# Patient Record
Sex: FEMALE | Race: WHITE | NOT HISPANIC OR LATINO | Employment: FULL TIME | URBAN - METROPOLITAN AREA
[De-identification: names, ages, dates, MRNs, and addresses within clinical notes are randomized per-mention and may not be internally consistent; named-entity substitution may affect disease eponyms.]

---

## 2010-06-01 LAB — EXTERNAL HIV SCREEN: NORMAL

## 2017-11-23 ENCOUNTER — HOSPITAL ENCOUNTER (EMERGENCY)
Facility: HOSPITAL | Age: 52
Discharge: HOME | End: 2017-11-23
Attending: EMERGENCY MEDICINE | Admitting: EMERGENCY MEDICINE
Payer: COMMERCIAL

## 2017-11-23 VITALS
DIASTOLIC BLOOD PRESSURE: 87 MMHG | SYSTOLIC BLOOD PRESSURE: 154 MMHG | TEMPERATURE: 97.2 F | WEIGHT: 98 LBS | OXYGEN SATURATION: 99 % | HEART RATE: 98 BPM | RESPIRATION RATE: 20 BRPM

## 2017-11-23 DIAGNOSIS — J45.901 ACUTE ASTHMA EXACERBATION: Primary | ICD-10-CM

## 2017-11-23 LAB — MAGNESIUM SERPL-MCNC: 2 MG/DL (ref 1.6–2.6)

## 2017-11-23 PROCEDURE — 94644 CONT INHLJ TX 1ST HOUR: CPT

## 2017-11-23 PROCEDURE — 99285 EMERGENCY DEPT VISIT HI MDM: CPT

## 2017-11-23 PROCEDURE — 83735 ASSAY OF MAGNESIUM: CPT | Performed by: EMERGENCY MEDICINE

## 2017-11-23 PROCEDURE — 96365 THER/PROPH/DIAG IV INF INIT: CPT

## 2017-11-23 PROCEDURE — 36415 COLL VENOUS BLD VENIPUNCTURE: CPT | Performed by: EMERGENCY MEDICINE

## 2017-11-23 PROCEDURE — 94760 N-INVAS EAR/PLS OXIMETRY 1: CPT

## 2017-11-23 RX ORDER — PREDNISONE 20 MG/1
60 TABLET ORAL ONCE
Status: COMPLETED | OUTPATIENT
Start: 2017-11-23 | End: 2017-11-23

## 2017-11-23 RX ORDER — MAGNESIUM SULFATE HEPTAHYDRATE 40 MG/ML
2 INJECTION, SOLUTION INTRAVENOUS ONCE
Status: COMPLETED | OUTPATIENT
Start: 2017-11-23 | End: 2017-11-23

## 2017-11-23 RX ORDER — PREDNISONE 20 MG/1
40 TABLET ORAL DAILY
Qty: 15 TABLET | Refills: 0 | Status: SHIPPED | OUTPATIENT
Start: 2017-11-23 | End: 2017-11-27

## 2017-11-23 RX ORDER — SODIUM CHLORIDE FOR INHALATION 0.9 %
3 VIAL, NEBULIZER (ML) INHALATION ONCE
Status: COMPLETED | OUTPATIENT
Start: 2017-11-23 | End: 2017-11-23

## 2017-11-23 RX ADMIN — MAGNESIUM SULFATE HEPTAHYDRATE 2 G: 40 INJECTION, SOLUTION INTRAVENOUS at 13:03

## 2017-11-23 RX ADMIN — IPRATROPIUM BROMIDE 1 MG: 0.5 SOLUTION RESPIRATORY (INHALATION) at 13:06

## 2017-11-23 RX ADMIN — ALBUTEROL SULFATE 10 MG: 2.5 SOLUTION RESPIRATORY (INHALATION) at 13:06

## 2017-11-23 RX ADMIN — ISODIUM CHLORIDE 3 ML: 0.03 SOLUTION RESPIRATORY (INHALATION) at 13:06

## 2017-11-23 RX ADMIN — PREDNISONE 60 MG: 20 TABLET ORAL at 13:01

## 2017-11-23 NOTE — ED ATTENDING ATTESTATION
Sage Mccauley MD, saw and evaluated the patient  I have discussed the patient with the resident/non-physician practitioner and agree with the resident's/non-physician practitioner's findings, Plan of Care, and MDM as documented in the resident's/non-physician practitioner's note, except where noted  All available labs and Radiology studies were reviewed  At this point I agree with the current assessment done in the Emergency Department  I have conducted an independent evaluation of this patient a history and physical is as follows:  Severe sob, worsening over 5hr  Used inhaled steroids today  Never admitted or intubated, only one prior ED visit  Smokes 5 cigs a day  No fever or productive cough  Using inhalers every 4 hours without relief  Ros otherwise neg and 12 systems were reviewed  On exam, tachcyardic, tachypneic, relatively hypoxic, hypertensive  Tripoding, ab breathing, retracting, severe wheezing with poor air movement  Head and neck benign, heart without m/r/g  Ab benign  Neuro intact  Imp: status asthmaticus  Heart neb, steroids, mag   Pt with 45 min bedside crit care time outside of separately billable procedures    Critical Care Time  CritCare Time

## 2017-11-23 NOTE — ED PROVIDER NOTES
History  Chief Complaint   Patient presents with    Shortness of Breath     Pt presents with shortness of breath beginning a few hours ago  51 y/o female with a pmhx of asthma presenting to the ER with a chief complaint of wheeze and sob  States the symptoms started 5 hours ago while cooking thanksgiving dinner  Symptoms progressively worsened, took home albuterol inhaler multiple times with little relief  Came to the ER when symptoms progressed  Has not been admitted for asthma previously, never intubated, but has been on oral steroids in the past      A:  -Wheeze  -SOB  P:  -likely acute asth, exaccerbation   -CHAND neb   -Prednisone   -Magnesium   -re-evaluation         History provided by:  Patient   used: No    Shortness of Breath   Associated symptoms: wheezing    Associated symptoms: no abdominal pain, no diaphoresis, no fever and no vomiting        None       Past Medical History:   Diagnosis Date    Asthma        History reviewed  No pertinent surgical history  History reviewed  No pertinent family history  I have reviewed and agree with the history as documented  Social History   Substance Use Topics    Smoking status: Current Every Day Smoker     Types: Cigarettes    Smokeless tobacco: Never Used      Comment: 5/day    Alcohol use No        Review of Systems   Constitutional: Negative  Negative for appetite change, chills, diaphoresis, fatigue and fever  HENT: Negative  Eyes: Negative  Respiratory: Positive for chest tightness, shortness of breath and wheezing  Cardiovascular: Negative  Gastrointestinal: Negative for abdominal pain, blood in stool, constipation, diarrhea, nausea and vomiting  Endocrine: Negative  Genitourinary: Negative for decreased urine volume, difficulty urinating, dyspareunia, dysuria, flank pain, frequency, hematuria, pelvic pain, urgency, vaginal bleeding, vaginal discharge and vaginal pain  Musculoskeletal: Negative  Skin: Negative  Allergic/Immunologic: Negative  Neurological: Negative  Psychiatric/Behavioral: Negative  All other systems reviewed and are negative  Physical Exam  ED Triage Vitals [11/23/17 1244]   Temperature Pulse Respirations Blood Pressure SpO2   (!) 97 2 °F (36 2 °C) 102 20 (!) 207/93 94 %      Temp Source Heart Rate Source Patient Position - Orthostatic VS BP Location FiO2 (%)   Oral Monitor Sitting Left arm --      Pain Score       --           Orthostatic Vital Signs  Vitals:    11/23/17 1244 11/23/17 1300 11/23/17 1440   BP: (!) 207/93  154/87   Pulse: 102 100 98   Patient Position - Orthostatic VS: Sitting  Sitting       Physical Exam   Constitutional: She is oriented to person, place, and time  She appears well-developed and well-nourished  HENT:   Head: Normocephalic and atraumatic  Right Ear: External ear normal    Left Ear: External ear normal    Nose: Nose normal    Mouth/Throat: Oropharynx is clear and moist    Eyes: Conjunctivae and EOM are normal  Pupils are equal, round, and reactive to light  Neck: Normal range of motion  Neck supple  No JVD present  No tracheal deviation present  No thyromegaly present  Cardiovascular: Regular rhythm, normal heart sounds and intact distal pulses  Exam reveals no gallop and no friction rub  No murmur heard  Pulmonary/Chest: No stridor  She is in respiratory distress  She has wheezes  She has no rales  She exhibits no tenderness  Abdominal: Soft  Bowel sounds are normal  She exhibits no distension and no mass  There is no tenderness  There is no rebound and no guarding  No hernia  Musculoskeletal: Normal range of motion  She exhibits no edema, tenderness or deformity  Lymphadenopathy:     She has no cervical adenopathy  Neurological: She is alert and oriented to person, place, and time  She has normal reflexes  She displays normal reflexes  No cranial nerve deficit  She exhibits normal muscle tone   Coordination normal  Skin: Skin is warm  No rash noted  No erythema  No pallor  Psychiatric: She has a normal mood and affect  Her behavior is normal  Judgment and thought content normal    Nursing note and vitals reviewed  ED Medications  Medications   magnesium sulfate 2 g/50 mL IVPB (premix) 2 g (0 g Intravenous Stopped 11/23/17 1330)   albuterol inhalation solution 10 mg (10 mg Nebulization Given 11/23/17 1306)   ipratropium (ATROVENT) 0 02 % inhalation solution 1 mg (1 mg Nebulization Given 11/23/17 1306)   sodium chloride 0 9 % inhalation solution 3 mL (3 mL Nebulization Given 11/23/17 1306)   predniSONE tablet 60 mg (60 mg Oral Given 11/23/17 1301)       Diagnostic Studies  Results Reviewed     Procedure Component Value Units Date/Time    Magnesium [[id number]]  (Normal) Collected:  11/23/17 1303    Lab Status:  Final result Specimen:  Blood from Arm, Right Updated:  11/23/17 1332     Magnesium 2 0 mg/dL                  No orders to display         Procedures  Procedures      Phone Consults  ED Phone Contact    ED Course  ED Course as of Nov 24 2328   Thu Nov 23, 2017   1340 On re-evaluation, patient states work of breathing has improved  Will continue [place]  1427 On re-evaluation symptoms markedly improved at bedside  Faint expiratory wheezes noted                                  MDM  Number of Diagnoses or Management Options  Acute asthma exacerbation: new and requires workup     Amount and/or Complexity of Data Reviewed  Clinical lab tests: ordered and reviewed  Tests in the radiology section of CPT®: ordered and reviewed  Tests in the medicine section of CPT®: reviewed and ordered  Decide to obtain previous medical records or to obtain history from someone other than the patient: yes  Independent visualization of images, tracings, or specimens: yes    Patient Progress  Patient progress: stable    CritCare Time    Disposition  Final diagnoses:   Acute asthma exacerbation     Time reflects when diagnosis was documented in both MDM as applicable and the Disposition within this note     Time User Action Codes Description Comment    11/23/2017  2:28 PM Keshia Robison Add [[id number]] Acute asthma exacerbation       ED Disposition     ED Disposition Condition Comment    Discharge  Raven Morfin discharge to home/self care  Condition at discharge: Good        Follow-up Information     Follow up With Specialties Details Why Contact Info    Darlene Potts MD [facility] Medicine Schedule an appointment as soon as possible for a visit  [address] [place] [address] Drive  [phone]          Discharge Medication List as of 11/23/2017  2:29 PM      START taking these medications    Details   predniSONE 20 mg tablet Take 2 tablets by mouth daily for 4 days, Starting Thu 11/23/2017, Until Mon 11/27/2017, Print           No discharge procedures on file  ED Provider  Attending physically available and evaluated Jonnie Iyer I managed the patient along with the ED Attending      Electronically Signed by         Beau Umanzor DO  Resident  11/24/17 5594

## 2017-11-23 NOTE — DISCHARGE INSTRUCTIONS
Asthma   WHAT YOU NEED TO KNOW:   Asthma is a lung disease that makes breathing difficult  Chronic inflammation and reactions to triggers narrow the airways in the lungs  Asthma can become life-threatening if it is not managed  DISCHARGE INSTRUCTIONS:   Return to the emergency department if:   · You have severe shortness of breath  · Your lips or nails turn blue or gray  · The skin around your neck and ribs pulls in with each breath  · You have shortness of breath, even after you take your short-term medicine as directed  · Your peak flow numbers are in the red zone of your AAP  Contact your healthcare provider if:   · You run out of medicine before your next refill is due  · Your symptoms get worse  · You need to take more medicine than usual to control your symptoms  · You have questions or concerns about your condition or care  Medicines:   · Medicines  decrease inflammation, open airways, and make it easier to breathe  Medicines may be inhaled, taken as a pill, or injected  Short-term medicines relieve your symptoms quickly  Long-term medicines are used to prevent future attacks  You may also need medicine to help control your allergies  Ask your healthcare provider for more information about the medicine you are given and how to take it safely  · Take your medicine as directed  Contact your healthcare provider if you think your medicine is not helping or if you have side effects  Tell him of her if you are allergic to any medicine  Keep a list of the medicines, vitamins, and herbs you take  Include the amounts, and when and why you take them  Bring the list or the pill bottles to follow-up visits  Carry your medicine list with you in case of an emergency  Follow up with your healthcare provider as directed: You will need to return to make sure your medicine is working and your symptoms are controlled   You may be referred to an asthma specialist  Shawn Nagel may be asked to keep a record of your peak flow values and bring it with you to your appointments  Write down your questions so you remember to ask them during your visits  Manage your symptoms and prevent future attacks:   · Follow your Asthma Action Plan (AAP)  This is a written plan that you and your healthcare provider create  It explains which medicine you need and when to change doses if necessary  It also explains how you can monitor symptoms and use a peak flow meter  The meter measures how well your lungs are working  · Manage other health conditions , such as allergies, acid reflux, and sleep apnea  · Identify and avoid triggers  These may include pets, dust mites, mold, and cockroaches  · Do not smoke or be around others who smoke  Nicotine and other chemicals in cigarettes and cigars can cause lung damage  Ask your healthcare provider for information if you currently smoke and need help to quit  E-cigarettes or smokeless tobacco still contain nicotine  Talk to your healthcare provider before you use these products  · Ask about the flu vaccine  The flu can make your asthma worse  You may need a yearly flu shot  © 2017 [address] [facility] Information is for End User's use only and may not be sold, redistributed or otherwise used for commercial purposes  All illustrations and images included in CareNotes® are the copyrighted property of A [employer] A M , Inc  or [employer]  The above information is an  only  It is not intended as medical advice for individual conditions or treatments  Talk to your doctor, nurse or pharmacist before following any medical regimen to see if it is safe and effective for you

## 2018-07-06 ENCOUNTER — HOSPITAL ENCOUNTER (EMERGENCY)
Facility: HOSPITAL | Age: 53
Discharge: HOME | End: 2018-07-06
Attending: EMERGENCY MEDICINE
Payer: COMMERCIAL

## 2018-07-06 VITALS
TEMPERATURE: 97.1 F | SYSTOLIC BLOOD PRESSURE: 114 MMHG | WEIGHT: 101.85 LBS | OXYGEN SATURATION: 97 % | HEART RATE: 98 BPM | RESPIRATION RATE: 18 BRPM | DIASTOLIC BLOOD PRESSURE: 74 MMHG

## 2018-07-06 DIAGNOSIS — T14.8XXA ABRASION: ICD-10-CM

## 2018-07-06 DIAGNOSIS — S09.90XA INJURY OF HEAD, INITIAL ENCOUNTER: Primary | ICD-10-CM

## 2018-07-06 PROCEDURE — 99283 EMERGENCY DEPT VISIT LOW MDM: CPT

## 2018-07-06 PROCEDURE — 90471 IMMUNIZATION ADMIN: CPT

## 2018-07-06 PROCEDURE — 90715 TDAP VACCINE 7 YRS/> IM: CPT | Performed by: PHYSICIAN ASSISTANT

## 2018-07-06 RX ORDER — CEPHALEXIN 500 MG/1
500 CAPSULE ORAL ONCE
Status: COMPLETED | OUTPATIENT
Start: 2018-07-06 | End: 2018-07-06

## 2018-07-06 RX ORDER — ACETAMINOPHEN 325 MG/1
TABLET ORAL
Status: DISPENSED
Start: 2018-07-06 | End: 2018-07-07

## 2018-07-06 RX ORDER — CEPHALEXIN 500 MG/1
500 CAPSULE ORAL 3 TIMES DAILY
Qty: 30 CAPSULE | Refills: 0 | Status: SHIPPED | OUTPATIENT
Start: 2018-07-06 | End: 2018-07-16

## 2018-07-06 RX ORDER — ACETAMINOPHEN 325 MG/1
650 TABLET ORAL ONCE
Status: COMPLETED | OUTPATIENT
Start: 2018-07-06 | End: 2018-07-06

## 2018-07-06 RX ORDER — CEPHALEXIN 500 MG/1
CAPSULE ORAL
Status: DISPENSED
Start: 2018-07-06 | End: 2018-07-07

## 2018-07-06 RX ADMIN — ACETAMINOPHEN 650 MG: 325 TABLET ORAL at 14:10

## 2018-07-06 RX ADMIN — TETANUS TOXOID, REDUCED DIPHTHERIA TOXOID AND ACELLULAR PERTUSSIS VACCINE, ADSORBED 0.5 ML: 5; 2.5; 8; 8; 2.5 SUSPENSION INTRAMUSCULAR at 14:11

## 2018-07-06 RX ADMIN — CEPHALEXIN 500 MG: 500 CAPSULE ORAL at 14:10

## 2018-07-06 NOTE — DISCHARGE INSTRUCTIONS
Head Injury   WHAT YOU NEED TO KNOW:   A head injury is most often caused by a blow to the head  This may occur from a fall, bicycle injury, sports injury, being struck in the head, or a motor vehicle accident  DISCHARGE INSTRUCTIONS:   Call 911 or have someone else call for any of the following:   · You cannot be woken  · You have a seizure  · You stop responding to others or you faint  · You have blurry or double vision  · Your speech becomes slurred or confused  · You have arm or leg weakness, loss of feeling, or new problems with coordination  · Your pupils are larger than usual or one pupil is a different size than the other  · You have blood or clear fluid coming out of your ears or nose  Return to the emergency department if:   · You have repeated or forceful vomiting  · You feel confused  · Your headache gets worse or becomes severe  · You or someone caring for you notices that you are harder to wake than usual   Contact your healthcare provider if:   · Your symptoms last longer than 6 weeks after the injury  · You have questions or concerns about your condition or care  Medicines:   · Acetaminophen  decreases pain  Acetaminophen is available without a doctor's order  Ask how much to take and how often to take it  Follow directions  Acetaminophen can cause liver damage if not taken correctly  · Take your medicine as directed  Contact your healthcare provider if you think your medicine is not helping or if you have side effects  Tell him or her if you are allergic to any medicine  Keep a list of the medicines, vitamins, and herbs you take  Include the amounts, and when and why you take them  Bring the list or the pill bottles to follow-up visits  Carry your medicine list with you in case of an emergency  Self-care:   · Rest  or do quiet activities for 24 to 48 hours  Limit your time watching TV, using the computer, or doing tasks that require a lot of thinking  Slowly return to your normal activities as directed  Do not play sports or do activities that may cause you to get hit in the head  Ask your healthcare provider when you can return to sports  · Apply ice  on your head for 15 to 20 minutes every hour or as directed  Use an ice pack, or put crushed ice in a plastic bag  Cover it with a towel before you apply it to your skin  Ice helps prevent tissue damage and decreases swelling and pain  · Have someone stay with you for 24 hours  or as directed  This person can monitor you for complications and call [phone]  When you are awake the person should ask you a few questions to see if you are thinking clearly  An example would be to ask your name or your address  Prevent another head injury:   · Wear a helmet that fits properly  Do this when you play sports, or ride a bike, scooter, or skateboard  Helmets help decrease your risk of a serious head injury  Talk to your healthcare provider about other ways you can protect yourself if you play sports  · Wear your seat belt every time you are in a car  This helps to decrease your risk for a head injury if you are in a car accident  Follow up with your healthcare provider as directed:  Write down your questions so you remember to ask them during your visits  © 2017 [address] [employer]  Information is for End User's use only and may not be sold, redistributed or otherwise used for commercial purposes  All illustrations and images included in CareNotes® are the copyrighted property of [employer] M [employer]  or [employer]  The above information is an  only  It is not intended as medical advice for individual conditions or treatments  Talk to your doctor, nurse or pharmacist before following any medical regimen to see if it is safe and effective for you  Head Injury   WHAT YOU NEED TO KNOW:   A head injury is most often caused by a blow to the head   This may occur from a fall, bicycle injury, sports injury, being struck in the head, or a motor vehicle accident  DISCHARGE INSTRUCTIONS:   Call 911 or have someone else call for any of the following:   · You cannot be woken  · You have a seizure  · You stop responding to others or you faint  · You have blurry or double vision  · Your speech becomes slurred or confused  · You have arm or leg weakness, loss of feeling, or new problems with coordination  · Your pupils are larger than usual or one pupil is a different size than the other  · You have blood or clear fluid coming out of your ears or nose  Return to the emergency department if:   · You have repeated or forceful vomiting  · You feel confused  · Your headache gets worse or becomes severe  · You or someone caring for you notices that you are harder to wake than usual   Contact your healthcare provider if:   · Your symptoms last longer than 6 weeks after the injury  · You have questions or concerns about your condition or care  Medicines:   · Acetaminophen  decreases pain  Acetaminophen is available without a doctor's order  Ask how much to take and how often to take it  Follow directions  Acetaminophen can cause liver damage if not taken correctly  · Take your medicine as directed  Contact your healthcare provider if you think your medicine is not helping or if you have side effects  Tell him or her if you are allergic to any medicine  Keep a list of the medicines, vitamins, and herbs you take  Include the amounts, and when and why you take them  Bring the list or the pill bottles to follow-up visits  Carry your medicine list with you in case of an emergency  Self-care:   · Rest  or do quiet activities for 24 to 48 hours  Limit your time watching TV, using the computer, or doing tasks that require a lot of thinking  Slowly return to your normal activities as directed  Do not play sports or do activities that may cause you to get hit in the head   Ask your healthcare provider when you can return to sports  · Apply ice  on your head for 15 to 20 minutes every hour or as directed  Use an ice pack, or put crushed ice in a plastic bag  Cover it with a towel before you apply it to your skin  Ice helps prevent tissue damage and decreases swelling and pain  · Have someone stay with you for 24 hours  or as directed  This person can monitor you for complications and call [phone]  When you are awake the person should ask you a few questions to see if you are thinking clearly  An example would be to ask your name or your address  Prevent another head injury:   · Wear a helmet that fits properly  Do this when you play sports, or ride a bike, scooter, or skateboard  Helmets help decrease your risk of a serious head injury  Talk to your healthcare provider about other ways you can protect yourself if you play sports  · Wear your seat belt every time you are in a car  This helps to decrease your risk for a head injury if you are in a car accident  Follow up with your healthcare provider as directed:  Write down your questions so you remember to ask them during your visits  © 2017 [address] [facility] Information is for End User's use only and may not be sold, redistributed or otherwise used for commercial purposes  All illustrations and images included in CareNotes® are the copyrighted property of [employer] M , Inc  or [employer]  The above information is an  only  It is not intended as medical advice for individual conditions or treatments  Talk to your doctor, nurse or pharmacist before following any medical regimen to see if it is safe and effective for you  Abrasion   AMBULATORY CARE:   An abrasion  is a scrape on your skin  It happens when your skin rubs against a rough surface  Some examples of an abrasion include rug burn, a skinned elbow, or road rash  Abrasions can be many shapes and sizes   The wound may hurt, bleed, bruise, or swell  Seek care immediately if:   · The bleeding does not stop after 10 minutes of firm pressure  · You cannot rinse one or more foreign objects out of your wound  · You have red streaks on your skin coming from your wound  Contact your healthcare provider if:   · You have a fever or chills  · Your abrasion is red, warm, swollen, or draining pus  · You have questions or concerns about your condition or care  Care for your abrasion:   · Wash your hands and dry them with a clean towel  · Press a clean cloth against your wound to stop any bleeding  · Rinse your wound with a lot of clean water  Do not use harsh soap, alcohol, or iodine solutions  · Use a clean, wet cloth to remove any objects, such as small pieces of rocks or dirt  · Rub antibiotic ointment on your wound  This may help prevent infection and help your wound heal     · Cover the wound with a non-stick bandage  Change the bandage daily, and if gets wet or dirty  Follow up with your healthcare provider as directed:  Write down your questions so you remember to ask them during your visits  © 2017 [address] [facility] Information is for End User's use only and may not be sold, redistributed or otherwise used for commercial purposes  All illustrations and images included in CareNotes® are the copyrighted property of [employer]  or [employer]  The above information is an  only  It is not intended as medical advice for individual conditions or treatments  Talk to your doctor, nurse or pharmacist before following any medical regimen to see if it is safe and effective for you

## 2018-07-06 NOTE — ED PROVIDER NOTES
History  Chief Complaint   Patient presents with    Head Injury     pt stazac taht she hit her forehead on an open car door about 20 minutes pta  pt denies LOC and denies neck pain  pt stazac taht she heard a crack when she hit her head       History provided by:  Patient   used: No    Medical Problem   Location:  Pt hit  head on corner of car door   Severity:  Mild  Onset quality:  Sudden  Duration:  1 hour  Timing:  Constant  Chronicity:  New  Associated symptoms: no abdominal pain, no chest pain, no congestion, no cough, no diarrhea, no ear pain, no fatigue, no fever, no headaches, no loss of consciousness, no myalgias, no nausea, no rash, no rhinorrhea, no shortness of breath, no sore throat, no vomiting and no wheezing        None       Past Medical History:   Diagnosis Date    Asthma        History reviewed  No pertinent surgical history  History reviewed  No pertinent family history  I have reviewed and agree with the history as documented  Social History   Substance Use Topics    Smoking status: Current Every Day Smoker     Packs/day: 0 20     Types: Cigarettes    Smokeless tobacco: Never Used      Comment: 5/day    Alcohol use No        Review of Systems   Constitutional: Negative for fatigue and fever  HENT: Negative for congestion, ear pain, rhinorrhea and sore throat  Right forehead deep abrasion  No laceration    Eyes: Negative  Respiratory: Negative  Negative for cough, shortness of breath and wheezing  Cardiovascular: Negative  Negative for chest pain  Gastrointestinal: Negative  Negative for abdominal pain, diarrhea, nausea and vomiting  Endocrine: Negative  Genitourinary: Negative  Musculoskeletal: Negative  Negative for myalgias  Skin: Negative  Negative for rash  Allergic/Immunologic: Negative  Neurological: Negative  Negative for loss of consciousness and headaches  Hematological: Negative      Psychiatric/Behavioral: Negative  All other systems reviewed and are negative  Physical Exam  Physical Exam   Constitutional: She is oriented to person, place, and time  She appears well-developed and well-nourished  HENT:   Right Ear: External ear normal    Left Ear: External ear normal    Nose: Nose normal    Mouth/Throat: Oropharynx is clear and moist    Right forehead abrasion    Sure cleans wash and brooks set topical adhesive applied    Eyes: Conjunctivae and EOM are normal  Pupils are equal, round, and reactive to light  Neck: Normal range of motion  Neck supple  Cardiovascular: Normal rate and regular rhythm  Pulmonary/Chest: Effort normal and breath sounds normal    Abdominal: Soft  Bowel sounds are normal    Musculoskeletal: Normal range of motion  Neurological: She is alert and oriented to person, place, and time  Skin: Skin is warm  Psychiatric: She has a normal mood and affect  Her behavior is normal  Judgment and thought content normal    Nursing note and vitals reviewed        Vital Signs  ED Triage Vitals   Temperature Pulse Respirations Blood Pressure SpO2   07/06/18 1300 07/06/18 1300 07/06/18 1300 07/06/18 1300 07/06/18 1300   (!) 97 1 °F (36 2 °C) 98 18 114/74 97 %      Temp Source Heart Rate Source Patient Position - Orthostatic VS BP Location FiO2 (%)   07/06/18 1300 07/06/18 1300 07/06/18 1300 07/06/18 1300 --   Temporal Monitor Sitting Left arm       Pain Score       07/06/18 1410       3           Vitals:    07/06/18 1300   BP: 114/74   Pulse: 98   Patient Position - Orthostatic VS: Sitting       Visual Acuity      ED Medications  Medications   tetanus-diphtheria-acellular pertussis (BOOSTRIX) IM injection 0 5 mL (0 5 mL Intramuscular Given 7/6/18 1411)   acetaminophen (TYLENOL) tablet 650 mg (650 mg Oral Given 7/6/18 1410)   cephalexin (KEFLEX) capsule 500 mg (500 mg Oral Given 7/6/18 1410)       Diagnostic Studies  Results Reviewed     None                 No orders to display Procedures  Procedures       Phone Contacts  ED Phone Contact    ED Course                               MDM  CritCare Time    Disposition  Final diagnoses:   Injury of head, initial encounter   Abrasion     Time reflects when diagnosis was documented in both MDM as applicable and the Disposition within this note     Time User Action Codes Description Comment    7/6/2018  1:52 PM Romain Foster [S09 [id number]] Injury of head, initial encounter     7/6/2018  1:53 PM [address]  [address] Abrasion       ED Disposition     ED Disposition Condition Comment    Discharge  Joegabriel Onofreyuly Morfin discharge to home/self care  Condition at discharge: Good        Follow-up Information     Follow up With Specialties Details Why [address] Medicine Schedule an appointment as soon as possible for a visit return if condition worsens  [address] [facility], [address] [id number] [phone]          Discharge Medication List as of 7/6/2018  1:55 PM      START taking these medications    Details   cephalexin (KEFLEX) 500 mg capsule Take 1 capsule (500 mg total) by mouth 3 (three) times a day for 10 days, Starting Fri 7/6/2018, Until Mon 7/16/2018, Print           No discharge procedures on file      ED Provider  Electronically Signed by           Carlos Lutz PA-C  07/06/18 2326

## 2020-01-30 ENCOUNTER — TRANSCRIBE ORDERS (OUTPATIENT)
Dept: ADMINISTRATIVE | Facility: HOSPITAL | Age: 55
End: 2020-01-30

## 2020-02-09 ENCOUNTER — HOSPITAL ENCOUNTER (EMERGENCY)
Facility: HOSPITAL | Age: 55
Discharge: HOME | End: 2020-02-09
Attending: EMERGENCY MEDICINE | Admitting: EMERGENCY MEDICINE
Payer: COMMERCIAL

## 2020-02-09 ENCOUNTER — APPOINTMENT (EMERGENCY)
Dept: RADIOLOGY | Facility: HOSPITAL | Age: 55
End: 2020-02-09
Payer: COMMERCIAL

## 2020-02-09 VITALS
HEART RATE: 96 BPM | WEIGHT: 121.8 LBS | TEMPERATURE: 96.9 F | SYSTOLIC BLOOD PRESSURE: 159 MMHG | DIASTOLIC BLOOD PRESSURE: 93 MMHG | OXYGEN SATURATION: 99 % | RESPIRATION RATE: 16 BRPM

## 2020-02-09 DIAGNOSIS — M72.2 PLANTAR FASCIITIS: Primary | ICD-10-CM

## 2020-02-09 PROCEDURE — 99284 EMERGENCY DEPT VISIT MOD MDM: CPT | Performed by: PHYSICIAN ASSISTANT

## 2020-02-09 PROCEDURE — 73630 X-RAY EXAM OF FOOT: CPT

## 2020-02-09 PROCEDURE — 99283 EMERGENCY DEPT VISIT LOW MDM: CPT

## 2020-02-09 RX ORDER — PREDNISONE 10 MG/1
TABLET ORAL
Qty: 30 TABLET | Refills: 0 | Status: SHIPPED | OUTPATIENT
Start: 2020-02-09 | End: 2021-06-04 | Stop reason: ALTCHOICE

## 2020-02-09 NOTE — ED PROVIDER NOTES
History  Chief Complaint   Patient presents with    Foot Pain     pain to left heel  x 6 weeks - worse when getting up in the morning        Medical Problem   Location:  Pt with left heel pain for 2 months  keeps getting worse no known injury  Severity:  Mild  Onset quality:  Gradual  Duration:  2 months  Timing:  Constant  Progression:  Unchanged  Chronicity:  New  Associated symptoms: no abdominal pain, no chest pain, no congestion, no cough, no ear pain, no fatigue, no fever, no headaches, no loss of consciousness, no myalgias, no nausea, no rash, no rhinorrhea, no shortness of breath, no sore throat, no vomiting and no wheezing        None       Past Medical History:   Diagnosis Date    Asthma        History reviewed  No pertinent surgical history  History reviewed  No pertinent family history  I have reviewed and agree with the history as documented  Social History     Tobacco Use    Smoking status: Current Every Day Smoker     Packs/day: 0 20     Types: Cigarettes    Smokeless tobacco: Never Used   Substance Use Topics    Alcohol use: No    Drug use: No        Review of Systems   Constitutional: Negative  Negative for fatigue and fever  HENT: Negative  Negative for congestion, ear pain, rhinorrhea and sore throat  Eyes: Negative  Respiratory: Negative  Negative for cough, shortness of breath and wheezing  Cardiovascular: Negative  Negative for chest pain  Gastrointestinal: Negative  Negative for abdominal pain, nausea and vomiting  Endocrine: Negative  Genitourinary: Negative  Musculoskeletal: Negative  Negative for myalgias  Skin: Negative  Negative for rash  Allergic/Immunologic: Negative  Neurological: Negative  Negative for loss of consciousness and headaches  Hematological: Negative  Psychiatric/Behavioral: Negative  All other systems reviewed and are negative        Physical Exam  Physical Exam   Constitutional: She is oriented to person, place, and time  She appears well-developed and well-nourished  HENT:   Head: Normocephalic  Right Ear: External ear normal    Left Ear: External ear normal    Nose: Nose normal    Mouth/Throat: Oropharynx is clear and moist    Eyes: Pupils are equal, round, and reactive to light  Conjunctivae and EOM are normal    Neck: Normal range of motion  Neck supple  Cardiovascular: Normal rate, regular rhythm, normal heart sounds and intact distal pulses  Pulmonary/Chest: Effort normal and breath sounds normal    Abdominal: Soft  Bowel sounds are normal    Musculoskeletal:   Left heel and arch of foot pain no erythema no swelling no warmth         Neurological: She is alert and oriented to person, place, and time  Skin: Skin is warm  Capillary refill takes less than 2 seconds  Psychiatric: She has a normal mood and affect  Nursing note and vitals reviewed  Vital Signs  ED Triage Vitals [02/09/20 0806]   Temperature Pulse Respirations Blood Pressure SpO2   (!) 96 9 °F (36 1 °C) 96 16 159/93 99 %      Temp Source Heart Rate Source Patient Position - Orthostatic VS BP Location FiO2 (%)   Tympanic Monitor Sitting Left arm --      Pain Score       --           Vitals:    02/09/20 0806   BP: 159/93   Pulse: 96   Patient Position - Orthostatic VS: Sitting         Visual Acuity      ED Medications  Medications - No data to display    Diagnostic Studies  Results Reviewed     None                 XR foot 3+ views LEFT   Final Result by Renetta Santiago MD (02/09 2170)      No acute osseous abnormality  Workstation performed: [id number]                    Procedures  Procedures         ED Course                               MDM      Disposition  Final diagnoses:   Plantar fasciitis     Time reflects when diagnosis was documented in both MDM as applicable and the Disposition within this note     Time User Action Codes Description Comment    2/9/2020  9:09 AM Almas Hall   Add [M72 2] Plantar fasciitis       ED Disposition     ED Disposition Condition Date/Time Comment    Discharge Stable Sun Feb 9, 2020  9:09 AM Chelsea Lamar discharge to home/self care  Follow-up Information     Follow up With Specialties Details Why Contact Info    Luisa Frost DPM Podiatry, Wound Care   [address]            Discharge Medication List as of 2/9/2020  9:10 AM      START taking these medications    Details   predniSONE 10 mg tablet 5 tabs po qd x 2 days then 4 tabs po qd x 2 days then 3 tabs po qd x 2 days then 2 tabs po qd x 2 days then 1 tab po qd x 2 days, Print           No discharge procedures on file      ED Provider  Electronically Signed by           Michael Davies PA-C  02/09/20 7883

## 2021-05-25 ENCOUNTER — IMMUNIZATIONS (OUTPATIENT)
Dept: FAMILY MEDICINE CLINIC | Facility: HOSPITAL | Age: 56
End: 2021-05-25

## 2021-05-25 DIAGNOSIS — Z23 ENCOUNTER FOR IMMUNIZATION: Primary | ICD-10-CM

## 2021-05-25 PROCEDURE — 91301 SARS-COV-2 / COVID-19 MRNA VACCINE (MODERNA) 100 MCG: CPT

## 2021-05-25 PROCEDURE — 0011A SARS-COV-2 / COVID-19 MRNA VACCINE (MODERNA) 100 MCG: CPT

## 2021-06-04 ENCOUNTER — OFFICE VISIT (OUTPATIENT)
Dept: FAMILY MEDICINE CLINIC | Facility: CLINIC | Age: 56
End: 2021-06-04
Payer: COMMERCIAL

## 2021-06-04 ENCOUNTER — TELEPHONE (OUTPATIENT)
Dept: ADMINISTRATIVE | Facility: OTHER | Age: 56
End: 2021-06-04

## 2021-06-04 VITALS
TEMPERATURE: 98 F | DIASTOLIC BLOOD PRESSURE: 70 MMHG | RESPIRATION RATE: 16 BRPM | HEIGHT: 61 IN | HEART RATE: 88 BPM | SYSTOLIC BLOOD PRESSURE: 110 MMHG | BODY MASS INDEX: 21.14 KG/M2 | WEIGHT: 112 LBS | OXYGEN SATURATION: 97 %

## 2021-06-04 DIAGNOSIS — Z86.19 HISTORY OF HEPATITIS: ICD-10-CM

## 2021-06-04 DIAGNOSIS — Z12.4 CERVICAL CANCER SCREENING: ICD-10-CM

## 2021-06-04 DIAGNOSIS — R59.9 SWOLLEN LYMPH NODES: Primary | ICD-10-CM

## 2021-06-04 DIAGNOSIS — Z12.31 ENCOUNTER FOR SCREENING MAMMOGRAM FOR MALIGNANT NEOPLASM OF BREAST: ICD-10-CM

## 2021-06-04 DIAGNOSIS — B00.9 HERPES SIMPLEX: ICD-10-CM

## 2021-06-04 DIAGNOSIS — R53.83 OTHER FATIGUE: ICD-10-CM

## 2021-06-04 DIAGNOSIS — N95.1 HOT FLASH, MENOPAUSAL: ICD-10-CM

## 2021-06-04 DIAGNOSIS — Z83.3 FAMILY HISTORY OF DIABETES MELLITUS (DM): ICD-10-CM

## 2021-06-04 DIAGNOSIS — Z13.220 SCREENING CHOLESTEROL LEVEL: ICD-10-CM

## 2021-06-04 DIAGNOSIS — Z80.0 FAMILY HISTORY OF COLON CANCER: ICD-10-CM

## 2021-06-04 PROCEDURE — 99204 OFFICE O/P NEW MOD 45 MIN: CPT | Performed by: FAMILY MEDICINE

## 2021-06-04 RX ORDER — BUDESONIDE AND FORMOTEROL FUMARATE DIHYDRATE 80; 4.5 UG/1; UG/1
2 AEROSOL RESPIRATORY (INHALATION) 2 TIMES DAILY
COMMUNITY
End: 2021-11-01 | Stop reason: SDUPTHER

## 2021-06-04 RX ORDER — VALACYCLOVIR HYDROCHLORIDE 1 G/1
1000 TABLET, FILM COATED ORAL 2 TIMES DAILY
Qty: 14 TABLET | Refills: 0 | Status: SHIPPED | OUTPATIENT
Start: 2021-06-04 | End: 2022-03-17

## 2021-06-04 NOTE — ASSESSMENT & PLAN NOTE
Cold sore on lower lip  Tristen Acosta took her 's Valtrex and reports that it has improved  This is the first cold sore she has ever gotten, but her  has outbreaks occasionally  She reports that he is just getting over an outbreak, and they had been kissing a lot recently while [place] vacation at the beach  Valtrex prescribed

## 2021-06-04 NOTE — TELEPHONE ENCOUNTER
----- Message from Kamini Pickett RN sent at 6/3/2021  7:42 AM EDT -----  Regarding: hiv  06/03/21 7:43 AM    Hello, our patient Ezra Avery has had HIV completed/performed  Please assist in updating the patient chart by pulling the Care Everywhere (CE) document  The date of service is 2010       Thank you,  Kamini Pickett RN   [address]

## 2021-06-04 NOTE — ASSESSMENT & PLAN NOTE
Swollen/tender L sided supraclavicular lymph node since getting first COVID vaccine in L arm last week  It has gotten smaller since it first appeared and is less tender  Small, ~1cm, tender, rubbery, mobile nodule superior to L clavicle  No treatment needed  Reassurance provided  Lymph node should continue to get smaller and less tender with time

## 2021-06-04 NOTE — TELEPHONE ENCOUNTER
Upon review of the In Basket request we were able to locate, review, and update the patient chart as requested for HIV  Any additional questions or concerns should be emailed to the Practice Liaisons via [email or web address] email, please do not reply via In Basket      Thank you  Robert Ford

## 2021-06-04 NOTE — ASSESSMENT & PLAN NOTE
Previous positive testing for Hep B and Hep C in 2010 through [facility]  Olga Jasbir reports that she was told that her titers were low and she did not need treatment  Repeat acute and chronic hepatitis panels

## 2021-06-04 NOTE — PROGRESS NOTES
Assessment/Plan:    Swollen lymph nodes  Swollen/tender L sided supraclavicular lymph node since getting first COVID vaccine in L arm last week  It has gotten smaller since it first appeared and is less tender  Small, ~1cm, tender, rubbery, mobile nodule superior to L clavicle  No treatment needed  Reassurance provided  Lymph node should continue to get smaller and less tender with time  Herpes simplex  Cold sore on lower lip  Mark Bradley took her 's Valtrex and reports that it has improved  This is the first cold sore she has ever gotten, but her  has outbreaks occasionally  She reports that he is just getting over an outbreak, and they had been kissing a lot recently while on vacation at the beach  Valtrex prescribed  History of hepatitis  Previous positive testing for Hep B and Hep C in 2010 through [facility]  Mark Bradley reports that she was told that her titers were low and she did not need treatment  Repeat acute and chronic hepatitis panels  Diagnoses and all orders for this visit:    Swollen lymph nodes    Herpes simplex  -     valACYclovir (VALTREX) 1,000 mg tablet; Take 1 tablet (1,000 mg total) by mouth 2 (two) times a day for 7 days    Family history of colon cancer  -     Ambulatory referral to General Surgery; Future    Encounter for screening mammogram for malignant neoplasm of breast  -     Mammo screening bilateral w 3d & cad; Future    History of hepatitis  -     Comprehensive metabolic panel; Future  -     Hepatitis panel, acute; Future  -     Chronic Hepatitis Panel; Future    Screening cholesterol level  -     Lipid panel; Future    Family history of diabetes mellitus (DM)  -     HEMOGLOBIN A1C W/ EAG ESTIMATION; Future    Cervical cancer screening  -     Ambulatory referral to Gynecology; Future    Hot flash, menopausal  -     Ambulatory referral to Gynecology;  Future    Other fatigue  -     CBC and differential; Future    Other orders  -     Cancel: HIV 1/2 Antigen/Antibody (4th Generation) w Reflex [facility]; Future  -     budesonide-formoterol (SYMBICORT) 80-4 5 MCG/ACT inhaler; Inhale 2 puffs 2 (two) times a day Rinse mouth after use  Subjective:      Patient ID: Pura Newman is a 50 y o  female  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 to establish care and for evaluation of lip lesion and neck nodule  She is a behavioral health nurse at [employer]  The following portions of the patient's history were reviewed and updated as appropriate: allergies, current medications, past family history, past medical history, past social history, past surgical history and problem list     Review of Systems   Constitutional: Negative for chills and fever  HENT: Positive for mouth sores (lip)  Negative for congestion, sneezing and sore throat  Respiratory: Negative for shortness of breath  Cardiovascular: Negative for chest pain  Gastrointestinal: Negative for abdominal pain  Skin:        Nodule neck (L-sided)   Neurological: Negative for dizziness, syncope and headaches  Psychiatric/Behavioral: Negative for sleep disturbance  Objective:      /70 (BP Location: Left arm, Patient Position: Sitting, Cuff Size: Standard)   Pulse 88   Temp 98 °F (36 7 °C) (Temporal)   Resp 16   Ht 5' 0 5" (1 537 m)   Wt 50 8 kg (112 lb)   LMP 06/20/2018 (Approximate) Comment: has irregular periods  SpO2 97%   Breastfeeding No   BMI 21 51 kg/m²          Physical Exam  Vitals signs reviewed  Constitutional:       General: She is not in acute distress  Appearance: Normal appearance  She is not toxic-appearing  HENT:      Head: Normocephalic and atraumatic  Mouth/Throat:      Lips: Lesions (cold sore lower lip, medial) present  Eyes:      Extraocular Movements: Extraocular movements intact  Neck:      Musculoskeletal: Normal range of motion and neck supple  No neck rigidity or muscular tenderness       Cardiovascular:      Rate and Rhythm: Normal rate and regular rhythm  Pulses: Normal pulses  Heart sounds: Normal heart sounds  No murmur  Pulmonary:      Effort: Pulmonary effort is normal  No respiratory distress  Breath sounds: Normal breath sounds  No wheezing or rales  Musculoskeletal: Normal range of motion  Skin:     General: Skin is warm and dry  Neurological:      Mental Status: She is alert and oriented to person, place, and time  Psychiatric:         Mood and Affect: Mood normal          Behavior: Behavior normal          Thought Content:  Thought content normal

## 2021-06-11 ENCOUNTER — TELEMEDICINE (OUTPATIENT)
Dept: FAMILY MEDICINE CLINIC | Facility: CLINIC | Age: 56
End: 2021-06-11
Payer: COMMERCIAL

## 2021-06-11 DIAGNOSIS — B34.9 VIRAL INFECTION, UNSPECIFIED: ICD-10-CM

## 2021-06-11 DIAGNOSIS — R05.9 COUGH: ICD-10-CM

## 2021-06-11 DIAGNOSIS — B34.9 VIRAL INFECTION, UNSPECIFIED: Primary | ICD-10-CM

## 2021-06-11 PROCEDURE — U0003 INFECTIOUS AGENT DETECTION BY NUCLEIC ACID (DNA OR RNA); SEVERE ACUTE RESPIRATORY SYNDROME CORONAVIRUS 2 (SARS-COV-2) (CORONAVIRUS DISEASE [COVID-19]), AMPLIFIED PROBE TECHNIQUE, MAKING USE OF HIGH THROUGHPUT TECHNOLOGIES AS DESCRIBED BY CMS-2020-01-R: HCPCS | Performed by: PHYSICIAN ASSISTANT

## 2021-06-11 PROCEDURE — U0005 INFEC AGEN DETEC AMPLI PROBE: HCPCS | Performed by: PHYSICIAN ASSISTANT

## 2021-06-11 PROCEDURE — 99214 OFFICE O/P EST MOD 30 MIN: CPT | Performed by: PHYSICIAN ASSISTANT

## 2021-06-11 RX ORDER — DEXTROMETHORPHAN HYDROBROMIDE AND PROMETHAZINE HYDROCHLORIDE 15; 6.25 MG/5ML; MG/5ML
2.5 SOLUTION ORAL 4 TIMES DAILY PRN
Qty: 118 ML | Refills: 0 | Status: SHIPPED | OUTPATIENT
Start: 2021-06-11 | End: 2021-07-09 | Stop reason: ALTCHOICE

## 2021-06-11 NOTE — ASSESSMENT & PLAN NOTE
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

## 2021-06-11 NOTE — PROGRESS NOTES
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     COVID test ordered  Phenergan-DM sent to pharmacy for cough  Instructed Gallo Zurita to drink plenty of fluids and rest and to quarantine until we get her test result  Relevant Orders    Novel Coronavirus (Covid-19),PCR [facility] - Collected at [facility] or [facility]      Other Visit Diagnoses     Cough        Relevant Medications    Promethazine-DM (PHENERGAN-DM) 6 25-15 mg/5 mL oral syrup         Disposition:     I referred patient to one of our centralized sites for a COVID-19 swab  I have spent 20 minutes directly with the patient  Greater than 50% of this time was spent in counseling/coordination of care regarding: instructions for management and patient and family education  Encounter provider Eros Harley PA-C    Provider located at [facility] AT [address]  [address] [place] [id number] [phone]    Recent Visits  Date Type Provider Dept   06/04/21 Office Visit Eros Harley PA-C [address] recent visits within past 7 days and meeting all other requirements     Today's Visits  Date Type Provider Dept   06/11/21 Telemedicine Eros Harley PA-C [facility]   Showing today's visits and meeting all other requirements     Future Appointments  No visits were found meeting these conditions     Showing future appointments within next 150 days and meeting all other requirements      This virtual check-in was done via [employer] and patient was informed that this is a secure, HIPAA-compliant platform  She agrees to proceed  Patient agrees to participate in a virtual check in via telephone or video visit instead of presenting to the office to address urgent/immediate medical needs  Patient is aware this is a billable service  After connecting through [facility], the patient was identified by name and date of birth  Oly Melo was informed that this was a telemedicine visit and that the exam was being conducted confidentially over secure lines  My office door was closed  The patient was notified the following individuals were present in the room: Denisa Mesa, [address]  Oly Melo acknowledged consent and understanding of privacy and security of the telemedicine visit  I informed the patient that I have reviewed her record in Epic and presented the opportunity for her to ask any questions regarding the visit today  The patient agreed to participate  Subjective:   Oly Melo is a 50 y o  female who is concerned about COVID-19  Patient's symptoms include chills, nasal congestion, sore throat, loss of taste, cough (thick, tan sputum), nausea and headache  Patient denies fever (but taking Tylenol/ibuprofen), rhinorrhea, anosmia, shortness of breath, chest tightness, abdominal pain, vomiting, diarrhea and myalgias       Date of symptom onset: 6/9/2021    Exposure:   Contact with a person who is under investigation (PUI) for or who is positive for COVID-19 within the last 14 days?: No    Hospitalized recently for fever and/or lower respiratory symptoms?: No      Currently a healthcare worker that is involved in direct patient care?: No      Works in a special setting where the risk of COVID-19 transmission may be high? (this may include long-term care, correctional and assisted facilities; homeless shelters; assisted-living facilities and group homes ): No      Resident in a special setting where the risk of COVID-19 transmission may be high? (this may include long-term care, correctional and correction facilities; homeless shelters; assisted-living facilities and group homes ): No      No results found for: SARSCOV[employer], [address] [facility], [address] [facility], [place] [address]  Past Medical History:   Diagnosis Date    Asthma     Psoriasis      No past surgical history on file  Current Outpatient Medications   Medication Sig Dispense Refill    budesonide-formoterol (SYMBICORT) 80-4 5 MCG/ACT inhaler Inhale 2 puffs 2 (two) times a day Rinse mouth after use   Promethazine-DM (PHENERGAN-DM) 6 25-15 mg/5 mL oral syrup Take 2 5 mL by mouth 4 (four) times a day as needed for cough 118 mL 0    valACYclovir (VALTREX) 1,000 mg tablet Take 1 tablet (1,000 mg total) by mouth 2 (two) times a day for 7 days 14 tablet 0     No current facility-administered medications for this visit  No Known Allergies    Review of Systems   Constitutional: Positive for chills  Negative for fever (but taking Tylenol/ibuprofen)  HENT: Positive for congestion and sore throat  Negative for rhinorrhea  Respiratory: Positive for cough (thick, tan sputum)  Negative for chest tightness and shortness of breath  Gastrointestinal: Positive for nausea  Negative for abdominal pain, diarrhea and vomiting  Musculoskeletal: Negative for myalgias  Neurological: Positive for headaches  Objective: There were no vitals filed for this visit  Physical Exam  HENT:      Head: Normocephalic and atraumatic  Eyes:      Extraocular Movements: Extraocular movements intact  Pulmonary:      Effort: Pulmonary effort is normal  No respiratory distress  Neurological:      Mental Status: She is alert and oriented to person, place, and time  Psychiatric:         Mood and Affect: Mood normal          Behavior: Behavior normal          Thought Content: Thought content normal        VIRTUAL VISIT DISCLAIMER    Max Packer acknowledges that she has consented to an online visit or consultation   She understands that the online visit is based solely on information provided by her, and that, in the absence of a face-to-face physical evaluation by the physician, the diagnosis she receives is both limited and provisional in terms of accuracy and completeness  This is not intended to replace a full medical face-to-face evaluation by the physician  Amaris Larkin understands and accepts these terms

## 2021-06-11 NOTE — LETTER
June 11, 2021     Patient: Sara Hartley   YOB: 1965   Date of Visit: 6/11/2021       To Whom it May Concern:    Bryan Guevara is under my professional care  She was seen on 6/11/2021  She is currently under investigation for COVID-19 infection and is unable to return to work at this time  If you have any questions or concerns, please don't hesitate to call           Sincerely,          Opal Escobedo PA-C

## 2021-06-12 ENCOUNTER — TELEPHONE (OUTPATIENT)
Dept: URGENT CARE | Age: 56
End: 2021-06-12

## 2021-06-12 LAB — SARS-COV-2 RNA RESP QL NAA+PROBE: NEGATIVE

## 2021-06-12 NOTE — TELEPHONE ENCOUNTER
PT INQUIRING FOR COVID TEST RESULTS  NOTED RESULTS NEGATIVE AND REPORTED TO PT SAME  DIRECTED ON HOW TO SEEK ACCESS  E [facility] [address] WITH TECH SUPPORT  PT VERBALIZES UNDERSTANDING

## 2021-06-14 ENCOUNTER — TRANSCRIBE ORDERS (OUTPATIENT)
Dept: LAB | Facility: HOSPITAL | Age: 56
End: 2021-06-14

## 2021-06-14 ENCOUNTER — APPOINTMENT (OUTPATIENT)
Dept: LAB | Facility: HOSPITAL | Age: 56
End: 2021-06-14
Payer: COMMERCIAL

## 2021-06-14 ENCOUNTER — APPOINTMENT (OUTPATIENT)
Dept: LAB | Facility: HOSPITAL | Age: 56
End: 2021-06-14

## 2021-06-14 DIAGNOSIS — Z13.220 SCREENING CHOLESTEROL LEVEL: ICD-10-CM

## 2021-06-14 DIAGNOSIS — R53.83 OTHER FATIGUE: ICD-10-CM

## 2021-06-14 DIAGNOSIS — Z86.19 HISTORY OF HEPATITIS: ICD-10-CM

## 2021-06-14 DIAGNOSIS — Z00.8 HEALTH EXAMINATION IN POPULATION SURVEY: Primary | ICD-10-CM

## 2021-06-14 DIAGNOSIS — Z83.3 FAMILY HISTORY OF DIABETES MELLITUS (DM): ICD-10-CM

## 2021-06-14 LAB
ALBUMIN SERPL BCP-MCNC: 4.1 G/DL (ref 3–5.2)
ALP SERPL-CCNC: 106 U/L (ref 43–122)
ALT SERPL W P-5'-P-CCNC: 22 U/L
ANION GAP SERPL CALCULATED.3IONS-SCNC: 6 MMOL/L (ref 5–14)
AST SERPL W P-5'-P-CCNC: 35 U/L (ref 14–36)
BASOPHILS # BLD AUTO: 0 THOUSANDS/ΜL (ref 0–0.1)
BASOPHILS NFR BLD AUTO: 1 % (ref 0–1)
BILIRUB SERPL-MCNC: 0.6 MG/DL
BUN SERPL-MCNC: 15 MG/DL (ref 5–25)
CALCIUM SERPL-MCNC: 9.7 MG/DL (ref 8.4–10.2)
CHLORIDE SERPL-SCNC: 104 MMOL/L (ref 97–108)
CHOLEST SERPL-MCNC: 230 MG/DL
CO2 SERPL-SCNC: 31 MMOL/L (ref 22–30)
CREAT SERPL-MCNC: 0.57 MG/DL (ref 0.6–1.2)
EOSINOPHIL # BLD AUTO: 0.4 THOUSAND/ΜL (ref 0–0.4)
EOSINOPHIL NFR BLD AUTO: 5 % (ref 0–6)
ERYTHROCYTE [DISTWIDTH] IN BLOOD BY AUTOMATED COUNT: 12.9 %
EST. AVERAGE GLUCOSE BLD GHB EST-MCNC: 111 MG/DL
EST. AVERAGE GLUCOSE BLD GHB EST-MCNC: 114 MG/DL
GFR SERPL CREATININE-BSD FRML MDRD: 106 ML/MIN/1.73SQ M
GLUCOSE P FAST SERPL-MCNC: 95 MG/DL (ref 70–99)
HBA1C MFR BLD: 5.5 %
HBA1C MFR BLD: 5.6 %
HCT VFR BLD AUTO: 45.1 % (ref 36–46)
HDLC SERPL-MCNC: 53 MG/DL
HGB BLD-MCNC: 15.3 G/DL (ref 12–16)
LDLC SERPL CALC-MCNC: 132 MG/DL
LYMPHOCYTES # BLD AUTO: 2.6 THOUSANDS/ΜL (ref 0.5–4)
LYMPHOCYTES NFR BLD AUTO: 34 % (ref 25–45)
MCH RBC QN AUTO: 32.6 PG (ref 26–34)
MCHC RBC AUTO-ENTMCNC: 33.9 G/DL (ref 31–36)
MCV RBC AUTO: 96 FL (ref 80–100)
MONOCYTES # BLD AUTO: 0.7 THOUSAND/ΜL (ref 0.2–0.9)
MONOCYTES NFR BLD AUTO: 9 % (ref 1–10)
NEUTROPHILS # BLD AUTO: 4 THOUSANDS/ΜL (ref 1.8–7.8)
NEUTS SEG NFR BLD AUTO: 52 % (ref 45–65)
NONHDLC SERPL-MCNC: 177 MG/DL
PLATELET # BLD AUTO: 239 THOUSANDS/UL (ref 150–450)
PMV BLD AUTO: 8.8 FL (ref 8.9–12.7)
POTASSIUM SERPL-SCNC: 4.1 MMOL/L (ref 3.6–5)
PROT SERPL-MCNC: 7.6 G/DL (ref 5.9–8.4)
RBC # BLD AUTO: 4.69 MILLION/UL (ref 4–5.2)
SODIUM SERPL-SCNC: 141 MMOL/L (ref 137–147)
TRIGL SERPL-MCNC: 226 MG/DL
WBC # BLD AUTO: 7.6 THOUSAND/UL (ref 4.5–11)

## 2021-06-14 PROCEDURE — 87340 HEPATITIS B SURFACE AG IA: CPT

## 2021-06-14 PROCEDURE — 36415 COLL VENOUS BLD VENIPUNCTURE: CPT

## 2021-06-14 PROCEDURE — 83036 HEMOGLOBIN GLYCOSYLATED A1C: CPT

## 2021-06-14 PROCEDURE — 85025 COMPLETE CBC W/AUTO DIFF WBC: CPT

## 2021-06-14 PROCEDURE — 86803 HEPATITIS C AB TEST: CPT

## 2021-06-14 PROCEDURE — 86705 HEP B CORE ANTIBODY IGM: CPT

## 2021-06-14 PROCEDURE — 80074 ACUTE HEPATITIS PANEL: CPT

## 2021-06-14 PROCEDURE — 86704 HEP B CORE ANTIBODY TOTAL: CPT

## 2021-06-14 PROCEDURE — 80061 LIPID PANEL: CPT

## 2021-06-14 PROCEDURE — 80053 COMPREHEN METABOLIC PANEL: CPT

## 2021-06-15 LAB
HAV IGM SER QL: ABNORMAL
HBV CORE AB SER QL: ABNORMAL
HBV CORE IGM SER QL: ABNORMAL
HBV CORE IGM SER QL: ABNORMAL
HBV SURFACE AG SER QL: ABNORMAL
HBV SURFACE AG SER QL: ABNORMAL
HCV AB SER QL: ABNORMAL
HCV AB SER QL: ABNORMAL

## 2021-06-16 ENCOUNTER — TELEPHONE (OUTPATIENT)
Dept: FAMILY MEDICINE CLINIC | Facility: CLINIC | Age: 56
End: 2021-06-16

## 2021-06-16 NOTE — TELEPHONE ENCOUNTER
----- Message from Laureen Rose PA-C sent at 6/15/2021  7:48 AM EDT -----  Please call pt regarding negative COVID test result

## 2021-06-17 ENCOUNTER — TELEPHONE (OUTPATIENT)
Dept: FAMILY MEDICINE CLINIC | Facility: CLINIC | Age: 56
End: 2021-06-17

## 2021-06-17 DIAGNOSIS — R76.8 HEPATITIS C ANTIBODY TEST POSITIVE: Primary | ICD-10-CM

## 2021-06-17 NOTE — TELEPHONE ENCOUNTER
----- Message from Armaan Mckeon PA-C sent at 6/17/2021  8:29 AM EDT -----  Please call [email or web address] regarding lab results  Labs were normal except her Hepatitis C Antibody test was "Low Reactive" and her cholesterol is mildly elevated  I ordered an additional lab test to confirm Hep C; please have this drawn when possible  For cholesterol, no medication is needed at this time but please limit saturated fats and fried foods in the diet and get regular exercise to naturally lower cholesterol

## 2021-06-22 ENCOUNTER — IMMUNIZATIONS (OUTPATIENT)
Dept: FAMILY MEDICINE CLINIC | Facility: HOSPITAL | Age: 56
End: 2021-06-22

## 2021-06-22 DIAGNOSIS — Z23 ENCOUNTER FOR IMMUNIZATION: Primary | ICD-10-CM

## 2021-06-22 PROCEDURE — 91301 SARS-COV-2 / COVID-19 MRNA VACCINE (MODERNA) 100 MCG: CPT

## 2021-06-22 PROCEDURE — 0012A SARS-COV-2 / COVID-19 MRNA VACCINE (MODERNA) 100 MCG: CPT

## 2021-06-24 ENCOUNTER — TELEPHONE (OUTPATIENT)
Dept: FAMILY MEDICINE CLINIC | Facility: CLINIC | Age: 56
End: 2021-06-24

## 2021-06-24 NOTE — TELEPHONE ENCOUNTER
Patient left  yesterday requesting dr Madelin Oakley Phone number, called this morning got no answer and mail box is full, unable to leave a message

## 2021-07-09 ENCOUNTER — APPOINTMENT (OUTPATIENT)
Dept: LAB | Facility: HOSPITAL | Age: 56
End: 2021-07-09
Payer: COMMERCIAL

## 2021-07-09 ENCOUNTER — OFFICE VISIT (OUTPATIENT)
Dept: FAMILY MEDICINE CLINIC | Facility: CLINIC | Age: 56
End: 2021-07-09
Payer: COMMERCIAL

## 2021-07-09 VITALS
BODY MASS INDEX: 20.77 KG/M2 | SYSTOLIC BLOOD PRESSURE: 120 MMHG | HEART RATE: 96 BPM | DIASTOLIC BLOOD PRESSURE: 80 MMHG | HEIGHT: 61 IN | OXYGEN SATURATION: 98 % | TEMPERATURE: 97.5 F | WEIGHT: 110 LBS

## 2021-07-09 DIAGNOSIS — R76.8 HEPATITIS C ANTIBODY TEST POSITIVE: ICD-10-CM

## 2021-07-09 DIAGNOSIS — Z12.4 CERVICAL CANCER SCREENING: ICD-10-CM

## 2021-07-09 DIAGNOSIS — F17.200 SMOKER: ICD-10-CM

## 2021-07-09 DIAGNOSIS — Z11.3 SCREEN FOR STD (SEXUALLY TRANSMITTED DISEASE): ICD-10-CM

## 2021-07-09 DIAGNOSIS — Z23 ENCOUNTER FOR IMMUNIZATION: ICD-10-CM

## 2021-07-09 DIAGNOSIS — Z00.00 ANNUAL PHYSICAL EXAM: Primary | ICD-10-CM

## 2021-07-09 PROBLEM — Z86.19 HISTORY OF HEPATITIS: Status: RESOLVED | Noted: 2021-06-04 | Resolved: 2021-07-09

## 2021-07-09 PROBLEM — R59.9 SWOLLEN LYMPH NODES: Status: RESOLVED | Noted: 2021-06-04 | Resolved: 2021-07-09

## 2021-07-09 PROBLEM — B34.9 VIRAL INFECTION, UNSPECIFIED: Status: RESOLVED | Noted: 2021-06-11 | Resolved: 2021-07-09

## 2021-07-09 PROCEDURE — 87491 CHLMYD TRACH DNA AMP PROBE: CPT

## 2021-07-09 PROCEDURE — 87591 N.GONORRHOEAE DNA AMP PROB: CPT

## 2021-07-09 PROCEDURE — 36415 COLL VENOUS BLD VENIPUNCTURE: CPT

## 2021-07-09 PROCEDURE — 99396 PREV VISIT EST AGE 40-64: CPT | Performed by: PHYSICIAN ASSISTANT

## 2021-07-09 PROCEDURE — 87522 HEPATITIS C REVRS TRNSCRPJ: CPT

## 2021-07-09 PROCEDURE — 86592 SYPHILIS TEST NON-TREP QUAL: CPT

## 2021-07-09 RX ORDER — ALBUTEROL SULFATE 90 UG/1
2 AEROSOL, METERED RESPIRATORY (INHALATION) EVERY 6 HOURS PRN
COMMUNITY

## 2021-07-09 NOTE — ASSESSMENT & PLAN NOTE
Cough in the morning  2 cigarettes per day  Her  also smokes  Peterson Cabral is interested in quitting  Dizziness with patch before, didn't like gum/lozenges  Make appointment for next week to discuss medication options to assist with smoking cessation

## 2021-07-09 NOTE — PROGRESS NOTES
ADULT ANNUAL [address] [facility] PRIMARY CARE [facility]    NAME: Bon Clancy  AGE: 50 y o  SEX: female  : 1965     DATE: 2021     Assessment and Plan:     Problem List Items Addressed This Visit        Other    Smoker     Cough in the morning  2 cigarettes per day  Her  also smokes  Shahram Dean is interested in quitting  Dizziness with patch before, didn't like gum/lozenges  Make appointment for next week to discuss medication options to assist with smoking cessation  Encounter for immunization     Counseling provided regarding pneumococcal vaccine  Shahram Dean would like to get the vaccine after this weekend since she is working the weekend and she is concerned about how she may react to it since she got pretty fatigued after the COVID vaccines  Return next week for vaccination  Other Visit Diagnoses     Annual physical exam    -  Primary    Cervical cancer screening        Relevant Orders    Ambulatory referral to Gynecology    Screen for STD (sexually transmitted disease)        Relevant Orders    Chlamydia/GC amplified DNA by PCR    RPR          Immunizations and preventive care screenings were discussed with patient today  Appropriate education was printed on patient's after visit summary  Counseling:  Alcohol/drug use: discussed moderation in alcohol intake, the recommendations for healthy alcohol use, and avoidance of illicit drug use  Dental Health: discussed importance of regular tooth brushing, flossing, and dental visits  Injury prevention: discussed safety/seat belts, safety helmets, smoke detectors, carbon dioxide detectors, and smoking near bedding or upholstery  Sexual health: discussed sexually transmitted diseases, partner selection, use of condoms, avoidance of unintended pregnancy, and contraceptive alternatives  · Exercise: the importance of regular exercise/physical activity was discussed  Recommend exercise 3-5 times per week for at least 30 minutes  Tobacco Cessation Counseling: Tobacco cessation counseling was provided  The patient is sincerely urged to quit consumption of tobacco  She is ready to quit tobacco  Medication options discussed  Return in 1 year (on 7/9/2022) for Annual physical      Chief Complaint:     Chief Complaint   Patient presents with    Physical Exam      History of Present Illness:     Adult Annual Physical   Patient here for a comprehensive physical exam  The patient reports no problems  Diet and Physical Activity  · Diet/Nutrition: well balanced diet and consuming 3-5 servings of fruits/vegetables daily  · Exercise: no formal exercise  Depression Screening  PHQ-9 Depression Screening    PHQ-9:   Frequency of the following problems over the past two weeks:           General Health  · Sleep: sleeps well and gets 4-6 hours of sleep on average  · Hearing: normal - bilateral   · Vision: no vision problems, goes for regular eye exams and most recent eye exam <1 year ago  · Dental: regular dental visits and brushes teeth twice daily  /GYN Health  · Patient is: perimenopausal  · Last menstrual period: 1 week ago  · Contraceptive method: none  Review of Systems:     Review of Systems   Constitutional: Negative for chills, fatigue and fever  HENT: Negative for congestion, rhinorrhea, sinus pressure, sinus pain, sneezing and sore throat  Respiratory: Positive for cough (smoker's cough in am)  Negative for shortness of breath and wheezing  Cardiovascular: Negative for chest pain, palpitations and leg swelling  Gastrointestinal: Negative for abdominal pain, constipation, diarrhea, nausea and vomiting  Genitourinary: Negative for dysuria and hematuria  Musculoskeletal: Negative for arthralgias, back pain, myalgias, neck pain and neck stiffness  Neurological: Negative for dizziness, seizures and headaches     Psychiatric/Behavioral: Negative for sleep disturbance  The patient is not nervous/anxious  Past Medical History:     Past Medical History:   Diagnosis Date    Asthma     History of hepatitis 6/4/2021    Psoriasis       Past Surgical History:     History reviewed  No pertinent surgical history  Social History:     Social History     Socioeconomic History    Marital status: /Civil Union     Spouse name: None    Number of children: None    Years of education: None    Highest education level: None   Occupational History    None   Tobacco Use    Smoking status: Current Every Day Smoker     Packs/day: 0 20     Types: Cigarettes    Smokeless tobacco: Never Used   Substance and Sexual Activity    Alcohol use: Yes    Drug use: No    Sexual activity: Yes     Partners: Male   Other Topics Concern    None   Social History Narrative    None     Social Determinants of Health     Financial Resource Strain:     Difficulty of Paying Living Expenses:    Food Insecurity:     Worried About Running Out of Food in the Last Year:     [address] in the Last Year:    Transportation Needs:     Lack of Transportation (Medical):      Lack of Transportation (Non-Medical):    Physical Activity:     Days of Exercise per Week:     Minutes of Exercise per Session:    Stress:     Feeling of Stress :    Social Connections:     Frequency of Communication with Friends and Family:     Frequency of Social Gatherings with Friends and Family:     Attends Synagogue Services:     Active Member of Clubs or Organizations:     Attends Club or Organization Meetings:     Marital Status:    Intimate Partner Violence:     Fear of Current or Ex-Partner:     Emotionally Abused:     Physically Abused:     Sexually Abused:       Family History:     Family History   Problem Relation Age of Onset    Colon cancer Maternal Grandfather     Diabetes Paternal Grandfather       Current Medications:     Current Outpatient Medications   Medication Sig Dispense Refill    albuterol (PROVENTIL HFA,VENTOLIN HFA) 90 mcg/act inhaler Inhale 2 puffs every 6 (six) hours as needed      budesonide-formoterol (SYMBICORT) 80-4 5 MCG/ACT inhaler Inhale 2 puffs 2 (two) times a day Rinse mouth after use   valACYclovir (VALTREX) 1,000 mg tablet Take 1 tablet (1,000 mg total) by mouth 2 (two) times a day for 7 days 14 tablet 0     No current facility-administered medications for this visit  Allergies:     No Known Allergies   Physical Exam:     /80 (BP Location: Left arm, Patient Position: Sitting, Cuff Size: Adult)   Pulse 96   Temp 97 5 °F (36 4 °C) (Oral)   Ht 5' 0 5" (1 537 m)   Wt 49 9 kg (110 lb)   LMP 06/20/2018 (Approximate) Comment: has irregular periods  SpO2 98%   BMI 21 13 kg/m²     Physical Exam  Vitals reviewed  Constitutional:       General: She is not in acute distress  Appearance: Normal appearance  She is well-developed  She is not toxic-appearing  HENT:      Head: Normocephalic and atraumatic  Right Ear: Tympanic membrane, ear canal and external ear normal       Left Ear: Tympanic membrane, ear canal and external ear normal       Nose: Nose normal       Mouth/Throat:      Mouth: Mucous membranes are moist       Pharynx: Oropharynx is clear  Eyes:      Extraocular Movements: Extraocular movements intact  Conjunctiva/sclera: Conjunctivae normal    Neck:      Vascular: No carotid bruit  Cardiovascular:      Rate and Rhythm: Normal rate and regular rhythm  Pulses: Normal pulses  Heart sounds: Normal heart sounds  No murmur heard  No friction rub  No gallop  Pulmonary:      Effort: Pulmonary effort is normal  No respiratory distress  Breath sounds: Normal breath sounds  No stridor  No wheezing, rhonchi or rales  Abdominal:      General: Abdomen is flat  Bowel sounds are normal  There is no distension  Palpations: Abdomen is soft  There is no mass  Tenderness:  There is no abdominal tenderness  There is no guarding  Musculoskeletal:         General: Normal range of motion  Cervical back: Normal range of motion and neck supple  No rigidity  No muscular tenderness  Right lower leg: No edema  Left lower leg: No edema  Lymphadenopathy:      Cervical: No cervical adenopathy  Skin:     General: Skin is warm and dry  Neurological:      Mental Status: She is alert and oriented to person, place, and time  Gait: Gait normal    Psychiatric:         Mood and Affect: Mood normal          Behavior: Behavior normal          Thought Content:  Thought content normal           Jeovanny Cross PA-C  [address]

## 2021-07-09 NOTE — ASSESSMENT & PLAN NOTE
Counseling provided regarding pneumococcal vaccine  Emelyn Dean would like to get the vaccine after this weekend since she is working the weekend and she is concerned about how she may react to it since she got pretty fatigued after the COVID vaccines  Return next week for vaccination

## 2021-07-09 NOTE — PATIENT INSTRUCTIONS

## 2021-07-11 LAB
C TRACH DNA SPEC QL NAA+PROBE: NEGATIVE
HCV RNA SERPL NAA+PROBE-ACNC: NORMAL IU/ML
N GONORRHOEA DNA SPEC QL NAA+PROBE: NEGATIVE
TEST INFORMATION: NORMAL

## 2021-07-12 LAB — RPR SER QL: NORMAL

## 2021-07-13 ENCOUNTER — TELEPHONE (OUTPATIENT)
Dept: FAMILY MEDICINE CLINIC | Facility: CLINIC | Age: 56
End: 2021-07-13

## 2021-07-13 NOTE — TELEPHONE ENCOUNTER
----- Message from Kirk Licea PA-C sent at 7/13/2021  8:14 AM EDT -----  Please call patient regarding normal lab results  Her quantitative Hep C test was negative, so no further workup or treatment is indicated

## 2021-07-27 ENCOUNTER — OFFICE VISIT (OUTPATIENT)
Dept: FAMILY MEDICINE CLINIC | Facility: CLINIC | Age: 56
End: 2021-07-27
Payer: COMMERCIAL

## 2021-07-27 VITALS
HEART RATE: 94 BPM | HEIGHT: 60 IN | BODY MASS INDEX: 22.38 KG/M2 | OXYGEN SATURATION: 97 % | TEMPERATURE: 98 F | WEIGHT: 114 LBS | SYSTOLIC BLOOD PRESSURE: 110 MMHG | RESPIRATION RATE: 20 BRPM | DIASTOLIC BLOOD PRESSURE: 80 MMHG

## 2021-07-27 DIAGNOSIS — Z12.2 ENCOUNTER FOR SCREENING FOR LUNG CANCER: ICD-10-CM

## 2021-07-27 DIAGNOSIS — F17.200 SMOKER: ICD-10-CM

## 2021-07-27 DIAGNOSIS — Z12.4 CERVICAL CANCER SCREENING: Primary | ICD-10-CM

## 2021-07-27 PROCEDURE — 99214 OFFICE O/P EST MOD 30 MIN: CPT | Performed by: PHYSICIAN ASSISTANT

## 2021-07-27 RX ORDER — BUPROPION HYDROCHLORIDE 150 MG/1
150 TABLET, EXTENDED RELEASE ORAL 2 TIMES DAILY
Qty: 60 TABLET | Refills: 2 | Status: SHIPPED | OUTPATIENT
Start: 2021-07-27 | End: 2022-03-17 | Stop reason: ALTCHOICE

## 2021-07-27 NOTE — PROGRESS NOTES
Assessment/Plan:    Smoker  Discussed medication options to assist with smoking cessation  Iza Mcallister elected to begin bupropion  Take 150mg once daily for three days, then begin taking 150mg twice daily (maintenance dose)  Possible side effects reviewed  I recommended setting a quit date about 1 week after starting bupropion  Iza Mcallister tried NRT in the past and did not like it, so is declining NRT at this time  Ordered CT lung screening program, as pt is eligible for screening given smoking history  Smoking Cessation  phone number provided to patient ([phone]) for if she needs additional support  Return for monthly visits for monitoring while on bupropion  Expected duration of treatment 3 months  Diagnoses and all orders for this visit:    Cervical cancer screening    Smoker  -     buPROPion (WELLBUTRIN SR) 150 mg 12 hr tablet; Take 1 tablet (150 mg total) by mouth 2 (two) times a day    Encounter for screening for lung cancer  -     CT lung screening program; Future    Other orders  -     Cancel: Ambulatory referral to Gynecology; Future          Subjective:      Patient ID: Mauro Hudson is a 50 y o  female  Iza Mcallister presents to the office today for smoking cessation  She has no acute complaints and is enthusiastic about quitting smoking  The following portions of the patient's history were reviewed and updated as appropriate: allergies, current medications, past family history, past medical history, past social history, past surgical history and problem list     Review of Systems   Constitutional: Negative for chills, fatigue and fever  HENT: Negative for congestion, rhinorrhea, sinus pressure, sinus pain, sneezing and sore throat  Respiratory: Positive for cough  Negative for shortness of breath and wheezing  Cardiovascular: Negative for chest pain, palpitations and leg swelling     Gastrointestinal: Negative for abdominal pain, constipation, diarrhea, nausea and vomiting  Genitourinary: Negative for dysuria and hematuria  Musculoskeletal: Negative for arthralgias, back pain, myalgias, neck pain and neck stiffness  Neurological: Negative for dizziness, seizures and headaches  Objective:      /80 (BP Location: Left arm, Patient Position: Sitting, Cuff Size: Standard)   Pulse 94   Temp 98 °F (36 7 °C) (Temporal)   Resp 20   Ht 5' (1 524 m)   Wt 51 7 kg (114 lb)   LMP 06/20/2018 (Approximate) Comment: has irregular periods  SpO2 97%   BMI 22 26 kg/m²          Physical Exam  Vitals reviewed  Constitutional:       General: She is not in acute distress  Appearance: Normal appearance  She is not toxic-appearing  HENT:      Head: Normocephalic and atraumatic  Eyes:      Extraocular Movements: Extraocular movements intact  Conjunctiva/sclera: Conjunctivae normal    Cardiovascular:      Rate and Rhythm: Normal rate and regular rhythm  Pulses: Normal pulses  Heart sounds: Normal heart sounds  No murmur heard  No gallop  Pulmonary:      Effort: Pulmonary effort is normal  No respiratory distress  Breath sounds: Normal breath sounds  No stridor  No wheezing, rhonchi or rales  Musculoskeletal:         General: Normal range of motion  Right lower leg: No edema  Left lower leg: No edema  Skin:     General: Skin is warm and dry  Neurological:      Mental Status: She is alert and oriented to person, place, and time  Psychiatric:         Mood and Affect: Mood normal          Behavior: Behavior normal          Thought Content:  Thought content normal

## 2021-07-27 NOTE — ASSESSMENT & PLAN NOTE
Discussed medication options to assist with smoking cessation  Marciano Srivastava elected to begin bupropion  Take 150mg once daily for three days, then begin taking 150mg twice daily (maintenance dose)  Possible side effects reviewed  I recommended setting a quit date about 1 week after starting bupropion  Marciano Srivasatva tried NRT in the past and did not like it, so is declining NRT at this time  Ordered CT lung screening program, as pt is eligible for screening given smoking history  Smoking Cessation  phone number provided to patient ([phone]) for if she needs additional support  Return for monthly visits for monitoring while on bupropion  Expected duration of treatment 3 months

## 2021-08-11 ENCOUNTER — HOSPITAL ENCOUNTER (OUTPATIENT)
Dept: CT IMAGING | Facility: HOSPITAL | Age: 56
Discharge: HOME | End: 2021-08-11
Payer: COMMERCIAL

## 2021-08-11 DIAGNOSIS — Z12.2 ENCOUNTER FOR SCREENING FOR LUNG CANCER: ICD-10-CM

## 2021-08-11 PROCEDURE — 71271 CT THORAX LUNG CANCER SCR C-: CPT

## 2021-08-19 ENCOUNTER — TELEPHONE (OUTPATIENT)
Dept: FAMILY MEDICINE CLINIC | Facility: CLINIC | Age: 56
End: 2021-08-19

## 2021-08-19 DIAGNOSIS — N13.30 HYDRONEPHROSIS, LEFT: Primary | ICD-10-CM

## 2021-08-19 NOTE — TELEPHONE ENCOUNTER
Called Aundrea Domínguez to inform her of lung cancer screening CT result  CT was negative for nodules, so as far as lung cancer screening is concerned she should repeat screening in one year  However, there was an incidental finding of L sided hydronephrosis and proximal hydroureter  Pt is not having any flank pain, dysuria, changes in urination, fevers  CT renal protocol ordered  Pt had a reaction to contrast in the past (entire arm swelled up and she required epinephrine, etc), so she will need contrast allergy prep prior to the CT  Aundrea Domínguez verbalized understanding and will schedule the CT study

## 2021-08-20 DIAGNOSIS — Z91.041 HISTORY OF ALLERGY TO RADIOGRAPHIC CONTRAST MEDIA: Primary | ICD-10-CM

## 2021-08-20 RX ORDER — DIPHENHYDRAMINE HCL 50 MG
50 CAPSULE ORAL
Qty: 30 CAPSULE | Refills: 0 | Status: SHIPPED | OUTPATIENT
Start: 2021-08-20

## 2021-08-20 RX ORDER — PREDNISONE 50 MG/1
50 TABLET ORAL SEE ADMIN INSTRUCTIONS
Qty: 3 TABLET | Refills: 0 | Status: SHIPPED | OUTPATIENT
Start: 2021-08-20

## 2021-08-20 NOTE — NURSING NOTE
Allergy prep orders in place  Call placed to patient to review allergy prep instructions prior to CT scan scheduled on Wednesday, August 25, 2021 at [facility] at 5:30 pm  Per provider's orders patient instructed to take prednisone 50 mg 13 hours before CT scan at 4:30 am, prednisone 50 mg 7 hours before CT scan at 10:30 am, prednisone 50 mg 1 hour before CT scan at 4:30 pm and Benadryl 50 mg 1 hour before CT scan at 4:30 pm  Patient reminded to  the allergy prep medication from pharmacy and discussed need for a   Per patient no further questions at this time  Patient reminded of location, date and time of the expected procedure  Encouraged to call back with any further questions

## 2021-08-20 NOTE — NURSING NOTE
Call placed to patient to review allergy history  Patient reports she had a CT scan 8-10 years ago and had an allergic reaction to the IV contrast in which she developed swelling of her whole arm and was brought to the ER and given Benadryl IM  Tiger text message sent to ordering provider Thuy Lindsay PA-C to request orders for an allergy prep for patient to take prior to her CT scan  Per Amilcar Landrum PA-C will place order for allergy prep

## 2021-08-24 ENCOUNTER — OFFICE VISIT (OUTPATIENT)
Dept: OBGYN CLINIC | Facility: CLINIC | Age: 56
End: 2021-08-24

## 2021-08-24 VITALS
HEART RATE: 99 BPM | HEIGHT: 60 IN | BODY MASS INDEX: 21.6 KG/M2 | WEIGHT: 110 LBS | SYSTOLIC BLOOD PRESSURE: 145 MMHG | DIASTOLIC BLOOD PRESSURE: 81 MMHG

## 2021-08-24 DIAGNOSIS — Z12.4 CERVICAL CANCER SCREENING: ICD-10-CM

## 2021-08-24 DIAGNOSIS — Z12.39 ENCOUNTER FOR SCREENING FOR MALIGNANT NEOPLASM OF BREAST, UNSPECIFIED SCREENING MODALITY: ICD-10-CM

## 2021-08-24 DIAGNOSIS — Z01.419 ENCOUNTER FOR GYNECOLOGICAL EXAMINATION (GENERAL) (ROUTINE) WITHOUT ABNORMAL FINDINGS: Primary | ICD-10-CM

## 2021-08-24 DIAGNOSIS — R23.2 HOT FLASHES: ICD-10-CM

## 2021-08-24 PROCEDURE — 99386 PREV VISIT NEW AGE 40-64: CPT | Performed by: OBSTETRICS & GYNECOLOGY

## 2021-08-24 PROCEDURE — G0476 HPV COMBO ASSAY CA SCREEN: HCPCS | Performed by: OBSTETRICS & GYNECOLOGY

## 2021-08-24 PROCEDURE — G0145 SCR C/V CYTO,THINLAYER,RESCR: HCPCS | Performed by: OBSTETRICS & GYNECOLOGY

## 2021-08-24 NOTE — PROGRESS NOTES
Assessment/Plan:     No problem-specific Assessment & Plan notes found for this encounter  Diagnoses and all orders for this visit:    Encounter for gynecological examination (general) (routine) without abnormal findings    Cervical cancer screening  -     Liquid-based pap, screening    Encounter for screening for malignant neoplasm of breast, unspecified screening modality  -     Mammo screening bilateral w 3d & cad; Future    Hot flashes  -     estrogen, conjugated,-medroxyprogesterone (PREMPRO) 0 3-1 5 MG per tablet; Take 1 tablet by mouth daily      Depression Screening Follow-up Plan: Patient's depression screening was positive with a PHQ-2 score of 1  Their PHQ-9 score was 1  Clinically patient does not have depression  No treatment is required  Subjective:      Patient ID: Shauna Krishnan is a 49 y o  female presents for annual exam    Her last pap was about 5 years ago and was normal   She was taking high dose OCPs from her twin sister for the indication of hot flashes  Because of this she sometimes has her period  I advised her to stop taking theses pills  She is quitting smoking and desires low dose hormone replacement  She is   Her mammogram is scheduled  She has colon cancer screening scheduled  HPI    The following portions of the patient's history were reviewed and updated as appropriate: allergies, current medications, past family history, past medical history, past social history, past surgical history and problem list     Review of Systems      Objective:      /81   Pulse 99   Ht 5' (1 524 m)   Wt 49 9 kg (110 lb)   LMP 08/13/2021 (Exact Date) Comment: has irregular periods  BMI 21 48 kg/m²          Physical Exam  Vitals and nursing note reviewed  Exam conducted with a chaperone present  Constitutional:       General: She is not in acute distress  Appearance: She is well-developed  HENT:      Head: Normocephalic     Neck:      Thyroid: No thyromegaly  Cardiovascular:      Rate and Rhythm: Normal rate and regular rhythm  Heart sounds: Normal heart sounds  No murmur heard  Pulmonary:      Effort: Pulmonary effort is normal  No respiratory distress  Breath sounds: Normal breath sounds  No wheezing or rales  Chest:      Chest wall: No tenderness  Breasts:         Right: No swelling, bleeding, inverted nipple, mass, nipple discharge, skin change or tenderness  Left: No swelling, bleeding, inverted nipple, mass, nipple discharge, skin change or tenderness  Abdominal:      General: Bowel sounds are normal  There is no distension  Palpations: Abdomen is soft  There is no mass  Tenderness: There is no abdominal tenderness  There is no guarding or rebound  Genitourinary:     Labia:         Right: No rash, tenderness, lesion or injury  Left: No rash, tenderness, lesion or injury  Vagina: Normal       Cervix: Normal       Uterus: Normal        Adnexa:         Right: No mass, tenderness or fullness  Left: No mass, tenderness or fullness  Rectum: No external hemorrhoid  Skin:     General: Skin is warm and dry  Neurological:      Mental Status: She is alert and oriented to person, place, and time  Psychiatric:         Behavior: Behavior normal          Thought Content:  Thought content normal          Judgment: Judgment normal

## 2021-08-25 ENCOUNTER — HOSPITAL ENCOUNTER (OUTPATIENT)
Dept: CT IMAGING | Facility: HOSPITAL | Age: 56
Discharge: HOME | End: 2021-08-25
Payer: COMMERCIAL

## 2021-08-25 DIAGNOSIS — N13.30 HYDRONEPHROSIS, LEFT: ICD-10-CM

## 2021-08-25 PROCEDURE — 74178 CT ABD&PLV WO CNTR FLWD CNTR: CPT

## 2021-08-25 PROCEDURE — G1004 CDSM NDSC: HCPCS

## 2021-08-25 RX ADMIN — IOHEXOL 100 ML: 350 INJECTION, SOLUTION INTRAVENOUS at 17:19

## 2021-08-26 LAB
HPV HR 12 DNA CVX QL NAA+PROBE: NEGATIVE
HPV16 DNA CVX QL NAA+PROBE: NEGATIVE
HPV18 DNA CVX QL NAA+PROBE: NEGATIVE

## 2021-08-31 LAB
LAB AP GYN PRIMARY INTERPRETATION: NORMAL
Lab: NORMAL

## 2021-09-01 DIAGNOSIS — N20.1 LEFT URETERAL CALCULUS: Primary | ICD-10-CM

## 2021-09-01 DIAGNOSIS — N13.30 HYDROURETERONEPHROSIS: ICD-10-CM

## 2021-09-08 ENCOUNTER — TELEPHONE (OUTPATIENT)
Dept: OTHER | Facility: OTHER | Age: 56
End: 2021-09-08

## 2021-09-08 NOTE — TELEPHONE ENCOUNTER
Patient was referred by her PCP to follow up with urology on her CT scan  A new patient appointment is scheduled for tomorrow 9/9/21 at 0830 at [facility]

## 2021-09-09 ENCOUNTER — OFFICE VISIT (OUTPATIENT)
Dept: UROLOGY | Facility: CLINIC | Age: 56
End: 2021-09-09
Payer: COMMERCIAL

## 2021-09-09 VITALS
BODY MASS INDEX: 21.09 KG/M2 | WEIGHT: 107.4 LBS | DIASTOLIC BLOOD PRESSURE: 88 MMHG | HEIGHT: 60 IN | HEART RATE: 82 BPM | SYSTOLIC BLOOD PRESSURE: 138 MMHG

## 2021-09-09 DIAGNOSIS — N20.1 LEFT URETERAL CALCULUS: ICD-10-CM

## 2021-09-09 DIAGNOSIS — N13.30 HYDROURETERONEPHROSIS: ICD-10-CM

## 2021-09-09 DIAGNOSIS — N20.1 URETERAL CALCULUS: ICD-10-CM

## 2021-09-09 DIAGNOSIS — N20.0 NEPHROLITHIASIS: ICD-10-CM

## 2021-09-09 LAB
SL AMB  POCT GLUCOSE, UA: NORMAL
SL AMB LEUKOCYTE ESTERASE,UA: NORMAL
SL AMB POCT BILIRUBIN,UA: NORMAL
SL AMB POCT BLOOD,UA: NORMAL
SL AMB POCT CLARITY,UA: CLEAR
SL AMB POCT COLOR,UA: NORMAL
SL AMB POCT KETONES,UA: NORMAL
SL AMB POCT NITRITE,UA: NORMAL
SL AMB POCT PH,UA: 6
SL AMB POCT SPECIFIC GRAVITY,UA: 1.03
SL AMB POCT URINE PROTEIN: NORMAL
SL AMB POCT UROBILINOGEN: 0.2

## 2021-09-09 PROCEDURE — 99203 OFFICE O/P NEW LOW 30 MIN: CPT | Performed by: PHYSICIAN ASSISTANT

## 2021-09-09 PROCEDURE — 87086 URINE CULTURE/COLONY COUNT: CPT | Performed by: PHYSICIAN ASSISTANT

## 2021-09-09 PROCEDURE — 81002 URINALYSIS NONAUTO W/O SCOPE: CPT | Performed by: PHYSICIAN ASSISTANT

## 2021-09-09 RX ORDER — TAMSULOSIN HYDROCHLORIDE 0.4 MG/1
0.4 CAPSULE ORAL
Qty: 30 CAPSULE | Refills: 0 | Status: ON HOLD | OUTPATIENT
Start: 2021-09-09 | End: 2021-09-30 | Stop reason: SDUPTHER

## 2021-09-09 RX ORDER — CEFAZOLIN SODIUM 1 G/50ML
1000 SOLUTION INTRAVENOUS ONCE
Status: CANCELLED | OUTPATIENT
Start: 2021-09-30 | End: 2021-09-09

## 2021-09-09 NOTE — PROGRESS NOTES
9/9/2021      Chief Complaint   Patient presents with    Ureteral Calculus     Assessment and Plan    49 y o  female new patient     1  Left hydronephrosis  2  Left 7 mm mid ureteral calculus   2  Mild lower abdominal   4  Mild intermittent bilateral lower back pain  - Left-sided hydronephrosis and proximal hydroureter incidentally found on CT lung on 08/11/2021   - CT renal protocol completed on 08/25/2021 shows mild left hydronephrosis secondary to left mid ureteral 7 mm calculus as well as punctate 2 mm calculus just proximal   Additionally there is a region of narrowing of the left ureter just above calculi  There is punctate nonobstructing calculi seen in the right kidney upper pole  - Urine dip today shows 2+ blood  Negative for nitrites or leukocytes  Will send out for pre-op culture  - Recommend ureteroscopy and laser lithotripsy  Discussed risks of procedure, procedure details, and expected post-operative recovery  Patient agrees and case requested today  - Recommend 2-3 L water daily  Rx for Flomax given  Continue OTC NSAIDs and Tylenol as needed for pain control     - Discussed proper hydration and dietary modifications to prevent future kidney stone formation    - Discussed ER precautions  - F/u for surgery  History of Present Illness  Marbella Murcia is a 49 y o  female here for new patient evaluation of left hydronephrosis and kidney stone  Left-sided hydronephrosis and proximal hydroureter incidentally found on CT lung on 08/11/2021  CT renal protocol completed on 08/25/2021 shows mild left hydronephrosis secondary to left mid ureteral 7 mm calculus as well as punctate 2 mm calculus just proximal   Additionally there is a region of narrowing of the left ureter just above calculi  There is punctate nonobstructing calculi seen in the right kidney upper pole  She complains of mild lower abdominal pain as well as mild intermittent bilateral lower back pain   Denies fever, chills, hematuria, dysuria, vomiting, or nausea  Denies prior history of kidney stone or prior  surgical manipulation  Medical history includes asthma  She is a current smoker however is making attempts to quit with surgery  Allergies include iodine contrast dye  Denies blood thinners  Denies prior adverse reactions to anesthesia  Review of Systems   Constitutional: Negative for chills and fever  Respiratory: Negative for shortness of breath  Cardiovascular: Negative for chest pain  Gastrointestinal: Positive for abdominal pain  Negative for abdominal distention, constipation, diarrhea, nausea and vomiting  Genitourinary: Positive for flank pain  Negative for difficulty urinating, dysuria, frequency, hematuria and urgency  Musculoskeletal: Positive for back pain  Neurological: Negative for dizziness  Past Medical History  Past Medical History:   Diagnosis Date    Asthma     History of hepatitis 6/4/2021    Psoriasis     Ureteral calculus        Past Social History  History reviewed  No pertinent surgical history    Social History     Tobacco Use   Smoking Status Current Every Day Smoker    Packs/day: 0 20    Types: Cigarettes   Smokeless Tobacco Never Used       Past Family History  Family History   Problem Relation Age of Onset    Colon cancer Maternal Grandfather     Diabetes Paternal Grandfather        Past Social history  Social History     Socioeconomic History    Marital status: /Civil Union     Spouse name: Not on file    Number of children: Not on file    Years of education: Not on file    Highest education level: Not on file   Occupational History    Not on file   Tobacco Use    Smoking status: Current Every Day Smoker     Packs/day: 0 20     Types: Cigarettes    Smokeless tobacco: Never Used   Vaping Use    Vaping Use: Never used   Substance and Sexual Activity    Alcohol use: Yes     Comment: ocasionally    Drug use: No    Sexual activity: Yes Partners: Male   Other Topics Concern    Not on file   Social History Narrative    Not on file     Social Determinants of Health     Financial Resource Strain: Medium Risk    Difficulty of Paying Living Expenses: Somewhat hard   Food Insecurity: Food Insecurity Present    Worried About Running Out of Food in the Last Year: Sometimes true    Demi of Food in the Last Year: Sometimes true   Transportation Needs: No Transportation Needs    Lack of Transportation (Medical): No    Lack of Transportation (Non-Medical): No   Physical Activity:     Days of Exercise per Week:     Minutes of Exercise per Session:    Stress:     Feeling of Stress :    Social Connections:     Frequency of Communication with Friends and Family:     Frequency of Social Gatherings with Friends and Family:     Attends Mu-ism Services:     Active Member of Clubs or Organizations:     Attends Club or Organization Meetings:     Marital Status:    Intimate Partner Violence:     Fear of Current or Ex-Partner:     Emotionally Abused:     Physically Abused:     Sexually Abused:        Current Medications  Current Outpatient Medications   Medication Sig Dispense Refill    albuterol (PROVENTIL HFA,VENTOLIN HFA) 90 mcg/act inhaler Inhale 2 puffs every 6 (six) hours as needed       budesonide-formoterol (SYMBICORT) 80-4 5 MCG/ACT inhaler Inhale 2 puffs 2 (two) times a day Rinse mouth after use         buPROPion (WELLBUTRIN SR) 150 mg 12 hr tablet Take 1 tablet (150 mg total) by mouth 2 (two) times a day 60 tablet 2    valACYclovir (VALTREX) 1,000 mg tablet Take 1 tablet (1,000 mg total) by mouth 2 (two) times a day for 7 days (Patient taking differently: Take 1,000 mg by mouth as needed ) 14 tablet 0    diphenhydrAMINE (BENADRYL) 50 mg capsule Take 1 capsule (50 mg total) by mouth 60 minutes pre-procedure (Patient not taking: Reported on 9/9/2021) 30 capsule 0    estrogen, conjugated,-medroxyprogesterone (PREMPRO) 0 3-1 5 MG per tablet Take 1 tablet by mouth daily (Patient taking differently: Take 1 tablet by mouth daily Has not started yet) 30 tablet 3    predniSONE 50 mg tablet Take 1 tablet (50 mg total) by mouth see administration instructions Take 50mg 13 hours prior to procedure  Take 50 mg 7 hours prior to procedure  Take 50 mg 1 hour prior to procedure  (Patient not taking: Reported on 9/9/2021) 3 tablet 0    tamsulosin (FLOMAX) 0 4 mg Take 1 capsule (0 4 mg total) by mouth daily with dinner 30 capsule 0     No current facility-administered medications for this visit  Allergies  Allergies   Allergen Reactions    Dye [Iodinated Diagnostic Agents] Swelling         The following portions of the patient's history were reviewed and updated as appropriate: allergies, current medications, past medical history, past social history, past surgical history and problem list       Vitals  Vitals:    09/09/21 0827   BP: 138/88   Pulse: 82   Weight: 48 7 kg (107 lb 6 4 oz)   Height: 5' (1 524 m)           Physical Exam  Physical Exam  Constitutional:       Appearance: Normal appearance  She is normal weight  HENT:      Head: Normocephalic and atraumatic  Right Ear: External ear normal       Left Ear: External ear normal    Eyes:      General: No scleral icterus  Conjunctiva/sclera: Conjunctivae normal    Cardiovascular:      Rate and Rhythm: Normal rate and regular rhythm  Pulses: Normal pulses  Heart sounds: Normal heart sounds  Pulmonary:      Effort: Pulmonary effort is normal       Breath sounds: Wheezing present  Abdominal:      General: Abdomen is flat  Bowel sounds are normal  There is no distension  Palpations: Abdomen is soft  There is no mass  Tenderness: There is abdominal tenderness (mild LLQ tenderness)  There is right CVA tenderness and left CVA tenderness  There is no guarding or rebound  Hernia: No hernia is present  Musculoskeletal:         General: Normal range of motion  Cervical back: Normal range of motion  Skin:     General: Skin is warm and dry  Neurological:      General: No focal deficit present  Mental Status: She is alert and oriented to person, place, and time  Psychiatric:         Mood and Affect: Mood normal          Behavior: Behavior normal          Thought Content:  Thought content normal          Judgment: Judgment normal            Results  Recent Results (from the past 1 hour(s))   POCT urine dip    Collection Time: 09/09/21  8:32 AM   Result Value Ref Range    LEUKOCYTE ESTERASE,UA -     NITRITE,UA -     SL AMB POCT UROBILINOGEN 0 2     POCT URINE PROTEIN -      PH,UA 6 0     BLOOD,UA ++     SPECIFIC GRAVITY,UA 1 030     KETONES,UA -     BILIRUBIN,UA -     GLUCOSE, UA -      COLOR,UA opal     CLARITY,UA clear    ]  No results found for: PSA  Lab Results   Component Value Date    CALCIUM 9 7 06/14/2021    K 4 1 06/14/2021    CO2 31 (H) 06/14/2021     06/14/2021    BUN 15 06/14/2021    CREATININE 0 57 (L) 06/14/2021     Lab Results   Component Value Date    WBC 7 60 06/14/2021    HGB 15 3 06/14/2021    HCT 45 1 06/14/2021    MCV 96 06/14/2021     06/14/2021           Orders  Orders Placed This Encounter   Procedures    POCT urine dip       Bambi Delgado PA-C

## 2021-09-10 LAB — BACTERIA UR CULT: NORMAL

## 2021-09-13 ENCOUNTER — TELEPHONE (OUTPATIENT)
Dept: UROLOGY | Facility: AMBULATORY SURGERY CENTER | Age: 56
End: 2021-09-13

## 2021-09-13 NOTE — TELEPHONE ENCOUNTER
Patient called and wanted to discuss a surgery date  I am currently holding 9/30 with Dr Verito Bates at [facility]  Patient aware that I need to confirm with provider prior to scheduling, but I will hold the date for her  Patient was very appreciative of my help and looks forward to hearing from me once the provider gets back to me to confirm

## 2021-09-17 ENCOUNTER — PREP FOR PROCEDURE (OUTPATIENT)
Dept: UROLOGY | Facility: AMBULATORY SURGERY CENTER | Age: 56
End: 2021-09-17

## 2021-09-17 DIAGNOSIS — N20.1 URETERAL CALCULUS: Primary | ICD-10-CM

## 2021-09-17 DIAGNOSIS — Z01.818 PRE-OPERATIVE EXAMINATION: ICD-10-CM

## 2021-09-17 DIAGNOSIS — R39.89 SUSPECTED UTI: ICD-10-CM

## 2021-09-17 NOTE — TELEPHONE ENCOUNTER
Spoke with patient and confirmed sx on 9/30 with Dr Jose Gong at [facility]  Patient will go for another urine culture next week  Patient did not have any additional questions at this time and was appreciative of my help

## 2021-09-22 ENCOUNTER — APPOINTMENT (OUTPATIENT)
Dept: LAB | Facility: HOSPITAL | Age: 56
End: 2021-09-22
Attending: UROLOGY
Payer: COMMERCIAL

## 2021-09-22 ENCOUNTER — TELEPHONE (OUTPATIENT)
Dept: UROLOGY | Facility: MEDICAL CENTER | Age: 56
End: 2021-09-22

## 2021-09-22 DIAGNOSIS — Z01.818 PRE-OPERATIVE EXAMINATION: ICD-10-CM

## 2021-09-22 DIAGNOSIS — N20.1 URETERAL CALCULUS: ICD-10-CM

## 2021-09-22 DIAGNOSIS — R39.89 SUSPECTED UTI: ICD-10-CM

## 2021-09-22 PROCEDURE — 87086 URINE CULTURE/COLONY COUNT: CPT

## 2021-09-22 NOTE — TELEPHONE ENCOUNTER
I spoke with patient and answered her questions regarding follow up after surgery  Patient was appreciative of me taking the time to speak with her

## 2021-09-23 LAB — BACTERIA UR CULT: NORMAL

## 2021-09-29 ENCOUNTER — ANESTHESIA EVENT (OUTPATIENT)
Dept: PERIOP | Facility: HOSPITAL | Age: 56
End: 2021-09-29
Payer: COMMERCIAL

## 2021-09-29 NOTE — TELEPHONE ENCOUNTER
Patient called in to inform her insurance company [employer] will be faxing over [facility] paperwork

## 2021-09-30 ENCOUNTER — ANESTHESIA (OUTPATIENT)
Dept: PERIOP | Facility: HOSPITAL | Age: 56
End: 2021-09-30
Payer: COMMERCIAL

## 2021-09-30 ENCOUNTER — HOSPITAL ENCOUNTER (OUTPATIENT)
Facility: HOSPITAL | Age: 56
Setting detail: OUTPATIENT SURGERY
Discharge: HOME | End: 2021-09-30
Attending: UROLOGY | Admitting: UROLOGY
Payer: COMMERCIAL

## 2021-09-30 ENCOUNTER — TELEPHONE (OUTPATIENT)
Dept: UROLOGY | Facility: MEDICAL CENTER | Age: 56
End: 2021-09-30

## 2021-09-30 ENCOUNTER — APPOINTMENT (OUTPATIENT)
Dept: RADIOLOGY | Facility: HOSPITAL | Age: 56
End: 2021-09-30
Payer: COMMERCIAL

## 2021-09-30 VITALS
RESPIRATION RATE: 16 BRPM | TEMPERATURE: 97 F | SYSTOLIC BLOOD PRESSURE: 105 MMHG | HEART RATE: 87 BPM | OXYGEN SATURATION: 97 % | DIASTOLIC BLOOD PRESSURE: 78 MMHG

## 2021-09-30 DIAGNOSIS — N20.1 URETERAL CALCULUS: ICD-10-CM

## 2021-09-30 DIAGNOSIS — N20.0 NEPHROLITHIASIS: Primary | ICD-10-CM

## 2021-09-30 PROBLEM — J45.909 ASTHMA: Status: ACTIVE | Noted: 2021-09-30

## 2021-09-30 LAB
EXT PREGNANCY TEST URINE: NEGATIVE
EXT. CONTROL: NORMAL

## 2021-09-30 PROCEDURE — 81025 URINE PREGNANCY TEST: CPT | Performed by: ANESTHESIOLOGY

## 2021-09-30 PROCEDURE — 82360 CALCULUS ASSAY QUANT: CPT | Performed by: UROLOGY

## 2021-09-30 PROCEDURE — C1769 GUIDE WIRE: HCPCS | Performed by: UROLOGY

## 2021-09-30 PROCEDURE — 74420 UROGRAPHY RTRGR +-KUB: CPT

## 2021-09-30 PROCEDURE — 52356 CYSTO/URETERO W/LITHOTRIPSY: CPT | Performed by: UROLOGY

## 2021-09-30 PROCEDURE — C2617 STENT, NON-COR, TEM W/O DEL: HCPCS | Performed by: UROLOGY

## 2021-09-30 DEVICE — VARIABLE LENGTH INJECTION STENT SET
Type: IMPLANTABLE DEVICE | Site: URETER | Status: FUNCTIONAL
Brand: CONTOUR VL™ INJECTION STENT SET

## 2021-09-30 RX ORDER — PROPOFOL 10 MG/ML
INJECTION, EMULSION INTRAVENOUS AS NEEDED
Status: DISCONTINUED | OUTPATIENT
Start: 2021-09-30 | End: 2021-09-30

## 2021-09-30 RX ORDER — CEFAZOLIN SODIUM 1 G/50ML
1000 SOLUTION INTRAVENOUS ONCE
Status: COMPLETED | OUTPATIENT
Start: 2021-09-30 | End: 2021-09-30

## 2021-09-30 RX ORDER — ONDANSETRON 2 MG/ML
4 INJECTION INTRAMUSCULAR; INTRAVENOUS EVERY 6 HOURS PRN
Status: DISCONTINUED | OUTPATIENT
Start: 2021-09-30 | End: 2021-09-30 | Stop reason: HOSPADM

## 2021-09-30 RX ORDER — SODIUM CHLORIDE 9 MG/ML
125 INJECTION, SOLUTION INTRAVENOUS CONTINUOUS
Status: DISCONTINUED | OUTPATIENT
Start: 2021-09-30 | End: 2021-09-30 | Stop reason: HOSPADM

## 2021-09-30 RX ORDER — MAGNESIUM HYDROXIDE 1200 MG/15ML
LIQUID ORAL AS NEEDED
Status: DISCONTINUED | OUTPATIENT
Start: 2021-09-30 | End: 2021-09-30 | Stop reason: HOSPADM

## 2021-09-30 RX ORDER — FENTANYL CITRATE 50 UG/ML
INJECTION, SOLUTION INTRAMUSCULAR; INTRAVENOUS AS NEEDED
Status: DISCONTINUED | OUTPATIENT
Start: 2021-09-30 | End: 2021-09-30

## 2021-09-30 RX ORDER — MIDAZOLAM HYDROCHLORIDE 2 MG/2ML
INJECTION, SOLUTION INTRAMUSCULAR; INTRAVENOUS AS NEEDED
Status: DISCONTINUED | OUTPATIENT
Start: 2021-09-30 | End: 2021-09-30

## 2021-09-30 RX ORDER — ONDANSETRON 2 MG/ML
4 INJECTION INTRAMUSCULAR; INTRAVENOUS ONCE AS NEEDED
Status: DISCONTINUED | OUTPATIENT
Start: 2021-09-30 | End: 2021-09-30 | Stop reason: HOSPADM

## 2021-09-30 RX ORDER — FENTANYL CITRATE/PF 50 MCG/ML
50 SYRINGE (ML) INJECTION
Status: DISCONTINUED | OUTPATIENT
Start: 2021-09-30 | End: 2021-09-30 | Stop reason: HOSPADM

## 2021-09-30 RX ORDER — OXYCODONE HYDROCHLORIDE 5 MG/1
5 TABLET ORAL EVERY 4 HOURS PRN
Status: DISCONTINUED | OUTPATIENT
Start: 2021-09-30 | End: 2021-09-30 | Stop reason: HOSPADM

## 2021-09-30 RX ORDER — DEXAMETHASONE SODIUM PHOSPHATE 4 MG/ML
INJECTION, SOLUTION INTRA-ARTICULAR; INTRALESIONAL; INTRAMUSCULAR; INTRAVENOUS; SOFT TISSUE AS NEEDED
Status: DISCONTINUED | OUTPATIENT
Start: 2021-09-30 | End: 2021-09-30

## 2021-09-30 RX ORDER — TRAMADOL HYDROCHLORIDE 50 MG/1
50 TABLET ORAL EVERY 6 HOURS PRN
Status: DISCONTINUED | OUTPATIENT
Start: 2021-09-30 | End: 2021-09-30 | Stop reason: HOSPADM

## 2021-09-30 RX ORDER — KETOROLAC TROMETHAMINE 30 MG/ML
15 INJECTION, SOLUTION INTRAMUSCULAR; INTRAVENOUS EVERY 6 HOURS SCHEDULED
Status: DISCONTINUED | OUTPATIENT
Start: 2021-09-30 | End: 2021-09-30 | Stop reason: HOSPADM

## 2021-09-30 RX ORDER — ONDANSETRON 2 MG/ML
INJECTION INTRAMUSCULAR; INTRAVENOUS AS NEEDED
Status: DISCONTINUED | OUTPATIENT
Start: 2021-09-30 | End: 2021-09-30

## 2021-09-30 RX ORDER — HYDROMORPHONE HCL/PF 1 MG/ML
0.5 SYRINGE (ML) INJECTION
Status: DISCONTINUED | OUTPATIENT
Start: 2021-09-30 | End: 2021-09-30 | Stop reason: HOSPADM

## 2021-09-30 RX ORDER — TAMSULOSIN HYDROCHLORIDE 0.4 MG/1
0.4 CAPSULE ORAL
Qty: 14 CAPSULE | Refills: 0 | Status: SHIPPED | OUTPATIENT
Start: 2021-09-30 | End: 2022-03-17

## 2021-09-30 RX ORDER — ACETAMINOPHEN 325 MG/1
650 TABLET ORAL EVERY 6 HOURS PRN
COMMUNITY

## 2021-09-30 RX ORDER — TRAMADOL HYDROCHLORIDE 50 MG/1
50 TABLET ORAL EVERY 6 HOURS PRN
Qty: 8 TABLET | Refills: 0 | Status: SHIPPED | OUTPATIENT
Start: 2021-09-30 | End: 2021-10-10

## 2021-09-30 RX ORDER — LIDOCAINE HYDROCHLORIDE 20 MG/ML
INJECTION, SOLUTION EPIDURAL; INFILTRATION; INTRACAUDAL; PERINEURAL AS NEEDED
Status: DISCONTINUED | OUTPATIENT
Start: 2021-09-30 | End: 2021-09-30

## 2021-09-30 RX ORDER — ACETAMINOPHEN 325 MG/1
650 TABLET ORAL EVERY 6 HOURS PRN
Status: DISCONTINUED | OUTPATIENT
Start: 2021-09-30 | End: 2021-09-30 | Stop reason: HOSPADM

## 2021-09-30 RX ORDER — KETOROLAC TROMETHAMINE 30 MG/ML
INJECTION, SOLUTION INTRAMUSCULAR; INTRAVENOUS AS NEEDED
Status: DISCONTINUED | OUTPATIENT
Start: 2021-09-30 | End: 2021-09-30

## 2021-09-30 RX ADMIN — FENTANYL CITRATE 25 MCG: 50 INJECTION INTRAMUSCULAR; INTRAVENOUS at 13:24

## 2021-09-30 RX ADMIN — PROPOFOL 170 MG: 10 INJECTION, EMULSION INTRAVENOUS at 12:59

## 2021-09-30 RX ADMIN — SODIUM CHLORIDE 125 ML/HR: 0.9 INJECTION, SOLUTION INTRAVENOUS at 11:01

## 2021-09-30 RX ADMIN — CEFAZOLIN SODIUM 1000 MG: 1 SOLUTION INTRAVENOUS at 12:54

## 2021-09-30 RX ADMIN — LIDOCAINE HYDROCHLORIDE 80 MG: 20 INJECTION, SOLUTION EPIDURAL; INFILTRATION; INTRACAUDAL; PERINEURAL at 12:59

## 2021-09-30 RX ADMIN — TRAMADOL HYDROCHLORIDE 50 MG: 50 TABLET, FILM COATED ORAL at 15:55

## 2021-09-30 RX ADMIN — FENTANYL CITRATE 25 MCG: 50 INJECTION INTRAMUSCULAR; INTRAVENOUS at 13:46

## 2021-09-30 RX ADMIN — FENTANYL CITRATE 50 MCG: 50 INJECTION INTRAMUSCULAR; INTRAVENOUS at 13:52

## 2021-09-30 RX ADMIN — MIDAZOLAM 2 MG: 1 INJECTION INTRAMUSCULAR; INTRAVENOUS at 12:52

## 2021-09-30 RX ADMIN — ONDANSETRON 4 MG: 2 INJECTION INTRAMUSCULAR; INTRAVENOUS at 14:12

## 2021-09-30 RX ADMIN — DEXAMETHASONE SODIUM PHOSPHATE 4 MG: 4 INJECTION INTRA-ARTICULAR; INTRALESIONAL; INTRAMUSCULAR; INTRAVENOUS; SOFT TISSUE at 12:59

## 2021-09-30 RX ADMIN — KETOROLAC TROMETHAMINE 30 MG: 30 INJECTION, SOLUTION INTRAMUSCULAR at 14:18

## 2021-09-30 RX ADMIN — SODIUM CHLORIDE 125 ML/HR: 0.9 INJECTION, SOLUTION INTRAVENOUS at 15:11

## 2021-09-30 NOTE — ANESTHESIA PREPROCEDURE EVALUATION
Procedure:  CYSTOSCOPY URETEROSCOPY WITH LITHOTRIPSY HOLMIUM LASER, RETROGRADE PYELOGRAM AND INSERTION STENT URETERAL (Left Bladder)    Relevant Problems   PULMONARY   (+) Asthma   (+) Smoker        Physical Exam    Airway    Mallampati score: II  TM Distance: >3 FB  Neck ROM: full     Dental   Comment: All uppers are capped  ,     Cardiovascular  Rhythm: regular, Rate: normal, Cardiovascular exam normal    Pulmonary  Breath sounds clear to auscultation, Decreased breath sounds,     Other Findings        Anesthesia Plan  ASA Score- 2     Anesthesia Type- general with ASA Monitors  Additional Monitors:   Airway Plan:           Plan Factors-    Chart reviewed  Existing labs reviewed  Patient summary reviewed  Patient is a current smoker  Patient instructed to abstain from smoking on day of procedure  Patient smoked on day of surgery  Induction- intravenous  Postoperative Plan-     Informed Consent- Anesthetic plan and risks discussed with patient and spouse

## 2021-09-30 NOTE — TELEPHONE ENCOUNTER
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

## 2021-10-01 RX ORDER — OXYBUTYNIN CHLORIDE 5 MG/1
5 TABLET ORAL 3 TIMES DAILY PRN
Qty: 30 TABLET | Refills: 0 | Status: SHIPPED | OUTPATIENT
Start: 2021-10-01 | End: 2022-03-17 | Stop reason: ALTCHOICE

## 2021-10-01 RX ORDER — IBUPROFEN 600 MG/1
600 TABLET ORAL EVERY 6 HOURS PRN
Qty: 30 TABLET | Refills: 0 | Status: SHIPPED | OUTPATIENT
Start: 2021-10-01

## 2021-10-01 NOTE — TELEPHONE ENCOUNTER
pts care is managed by the [facility] office  Pt is post op as of 9/30/21 ston removal/stent placement    Patient called with complaints of dysuria, frequency of urination  Pt stating she feels she has a urinary tract infection and is requesting urine testing  Orders placed for UA C&S to rule out urinary tract infection  Office will follow up as results become available usually within 48-72 hours  Patient encouraged to hydrate well with water and avoid bladder irritants  Patient instructed to call back with worsening symptoms, fever, chills, blood in the urine or difficulty urinating  Pt is with complete understanding  Pt will await urine testing results  Please be advised

## 2021-10-01 NOTE — TELEPHONE ENCOUNTER
Patient called to advise that since her procedure yesterday to get her stones blasted  She states she has UTI due to symptoms of frequency, burning sensation  She has med and wants to confirm with Dr before taking it

## 2021-10-01 NOTE — TELEPHONE ENCOUNTER
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

## 2021-10-01 NOTE — TELEPHONE ENCOUNTER
Please encourage patient to continue taking tamsulosin as previously prescribed  I have also given a prescription for ibuprofen and oxybutynin  She should maintain adequate hydration upwards to 40-60 oz of water intake per day  Avoid bladder irritating foods and beverages of coffee, soda, teas spicy foods and artificial sweeteners  Patient follow-up in the office as scheduled  Thank you

## 2021-10-04 ENCOUNTER — TELEPHONE (OUTPATIENT)
Dept: UROLOGY | Facility: MEDICAL CENTER | Age: 56
End: 2021-10-04

## 2021-10-04 ENCOUNTER — APPOINTMENT (OUTPATIENT)
Dept: LAB | Facility: HOSPITAL | Age: 56
End: 2021-10-04
Payer: COMMERCIAL

## 2021-10-04 ENCOUNTER — NURSE TRIAGE (OUTPATIENT)
Dept: OTHER | Facility: OTHER | Age: 56
End: 2021-10-04

## 2021-10-04 DIAGNOSIS — R39.89 SUSPECTED UTI: Primary | ICD-10-CM

## 2021-10-04 DIAGNOSIS — N20.1 URETERAL CALCULUS: ICD-10-CM

## 2021-10-04 DIAGNOSIS — R39.9 UTI SYMPTOMS: Primary | ICD-10-CM

## 2021-10-04 DIAGNOSIS — N20.1 URETERAL CALCULUS: Primary | ICD-10-CM

## 2021-10-04 LAB
BACTERIA UR QL AUTO: ABNORMAL /HPF
BILIRUB UR QL STRIP: ABNORMAL
CLARITY UR: ABNORMAL
COLOR UR: ABNORMAL
GLUCOSE UR STRIP-MCNC: NEGATIVE MG/DL
HGB UR QL STRIP.AUTO: ABNORMAL
KETONES UR STRIP-MCNC: ABNORMAL MG/DL
LEUKOCYTE ESTERASE UR QL STRIP: ABNORMAL
NITRITE UR QL STRIP: POSITIVE
NON-SQ EPI CELLS URNS QL MICRO: ABNORMAL /HPF
PH UR STRIP.AUTO: 5.5 [PH]
PROT UR STRIP-MCNC: ABNORMAL MG/DL
RBC #/AREA URNS AUTO: ABNORMAL /HPF
SP GR UR STRIP.AUTO: 1.03 (ref 1–1.03)
UROBILINOGEN UR QL STRIP.AUTO: 1 E.U./DL
WBC #/AREA URNS AUTO: ABNORMAL /HPF

## 2021-10-04 PROCEDURE — 87086 URINE CULTURE/COLONY COUNT: CPT

## 2021-10-04 PROCEDURE — 81001 URINALYSIS AUTO W/SCOPE: CPT

## 2021-10-04 RX ORDER — CEPHALEXIN 500 MG/1
500 CAPSULE ORAL EVERY 12 HOURS SCHEDULED
Qty: 10 CAPSULE | Refills: 0 | Status: SHIPPED | OUTPATIENT
Start: 2021-10-04 | End: 2021-10-09

## 2021-10-04 NOTE — TELEPHONE ENCOUNTER
MACIEL,    I spoke to the patient this morning regarding disability paper work  Patient stated she is still having UTI symptoms and that she was told to go for a Urine C&S if they continued  Patient stated she will be going to a [facility] this morning  I check in the chart and no order was placed  I placed order for patient

## 2021-10-05 LAB — BACTERIA UR CULT: NORMAL

## 2021-10-07 ENCOUNTER — CLINICAL SUPPORT (OUTPATIENT)
Dept: UROLOGY | Facility: MEDICAL CENTER | Age: 56
End: 2021-10-07
Payer: COMMERCIAL

## 2021-10-07 VITALS
DIASTOLIC BLOOD PRESSURE: 52 MMHG | HEIGHT: 60 IN | WEIGHT: 106 LBS | HEART RATE: 102 BPM | BODY MASS INDEX: 20.81 KG/M2 | SYSTOLIC BLOOD PRESSURE: 122 MMHG

## 2021-10-07 DIAGNOSIS — N20.1 URETERAL CALCULUS: Primary | ICD-10-CM

## 2021-10-07 DIAGNOSIS — Z12.11 ENCOUNTER FOR SCREENING COLONOSCOPY: ICD-10-CM

## 2021-10-07 PROCEDURE — 99211 OFF/OP EST MAY X REQ PHY/QHP: CPT

## 2021-10-08 LAB
CALCIUM OXALATE DIHYDRATE MFR STONE IR: 90 %
COLOR STONE: NORMAL
COM MFR STONE: 10 %
COMMENT-STONE3: NORMAL
COMPOSITION: NORMAL
LABORATORY COMMENT REPORT: NORMAL
PHOTO: NORMAL
SIZE STONE: NORMAL MM
SPEC SOURCE SUBJ: NORMAL
STONE ANALYSIS-IMP: NORMAL
WT STONE: 3 MG

## 2021-11-01 DIAGNOSIS — J45.20 MILD INTERMITTENT ASTHMA WITHOUT COMPLICATION: Primary | ICD-10-CM

## 2021-11-01 RX ORDER — BUDESONIDE AND FORMOTEROL FUMARATE DIHYDRATE 80; 4.5 UG/1; UG/1
2 AEROSOL RESPIRATORY (INHALATION) 2 TIMES DAILY
Qty: 10.2 G | Refills: 3 | Status: SHIPPED | OUTPATIENT
Start: 2021-11-01

## 2021-12-01 ENCOUNTER — HOSPITAL ENCOUNTER (OUTPATIENT)
Dept: MAMMOGRAPHY | Facility: CLINIC | Age: 56
Discharge: HOME | End: 2021-12-01
Payer: COMMERCIAL

## 2021-12-01 VITALS — WEIGHT: 106 LBS | HEIGHT: 60 IN | BODY MASS INDEX: 20.81 KG/M2

## 2021-12-01 DIAGNOSIS — Z12.31 ENCOUNTER FOR SCREENING MAMMOGRAM FOR MALIGNANT NEOPLASM OF BREAST: ICD-10-CM

## 2021-12-01 PROCEDURE — 77067 SCR MAMMO BI INCL CAD: CPT

## 2021-12-01 PROCEDURE — 77063 BREAST TOMOSYNTHESIS BI: CPT

## 2021-12-08 ENCOUNTER — TELEPHONE (OUTPATIENT)
Dept: UROLOGY | Facility: AMBULATORY SURGERY CENTER | Age: 56
End: 2021-12-08

## 2021-12-10 DIAGNOSIS — N20.1 URETERAL CALCULUS: Primary | ICD-10-CM

## 2021-12-16 ENCOUNTER — TELEPHONE (OUTPATIENT)
Dept: OTHER | Facility: OTHER | Age: 56
End: 2021-12-16

## 2022-01-04 PROCEDURE — U0003 INFECTIOUS AGENT DETECTION BY NUCLEIC ACID (DNA OR RNA); SEVERE ACUTE RESPIRATORY SYNDROME CORONAVIRUS 2 (SARS-COV-2) (CORONAVIRUS DISEASE [COVID-19]), AMPLIFIED PROBE TECHNIQUE, MAKING USE OF HIGH THROUGHPUT TECHNOLOGIES AS DESCRIBED BY CMS-2020-01-R: HCPCS | Performed by: PHYSICIAN ASSISTANT

## 2022-01-04 PROCEDURE — U0005 INFEC AGEN DETEC AMPLI PROBE: HCPCS | Performed by: PHYSICIAN ASSISTANT

## 2022-02-02 ENCOUNTER — HOSPITAL ENCOUNTER (OUTPATIENT)
Dept: ULTRASOUND IMAGING | Facility: HOSPITAL | Age: 57
Discharge: HOME | End: 2022-02-02
Attending: UROLOGY
Payer: COMMERCIAL

## 2022-02-02 DIAGNOSIS — N20.1 URETERAL CALCULUS: ICD-10-CM

## 2022-02-02 PROCEDURE — 76770 US EXAM ABDO BACK WALL COMP: CPT

## 2022-02-10 ENCOUNTER — OFFICE VISIT (OUTPATIENT)
Dept: UROLOGY | Facility: MEDICAL CENTER | Age: 57
End: 2022-02-10
Payer: COMMERCIAL

## 2022-02-10 VITALS
HEART RATE: 99 BPM | DIASTOLIC BLOOD PRESSURE: 80 MMHG | WEIGHT: 114 LBS | SYSTOLIC BLOOD PRESSURE: 110 MMHG | BODY MASS INDEX: 22.38 KG/M2 | HEIGHT: 60 IN

## 2022-02-10 DIAGNOSIS — N20.1 URETERAL CALCULUS: Primary | ICD-10-CM

## 2022-02-10 LAB
SL AMB  POCT GLUCOSE, UA: ABNORMAL
SL AMB LEUKOCYTE ESTERASE,UA: ABNORMAL
SL AMB POCT BILIRUBIN,UA: ABNORMAL
SL AMB POCT BLOOD,UA: ABNORMAL
SL AMB POCT CLARITY,UA: CLEAR
SL AMB POCT COLOR,UA: YELLOW
SL AMB POCT KETONES,UA: ABNORMAL
SL AMB POCT NITRITE,UA: ABNORMAL
SL AMB POCT PH,UA: 5.5
SL AMB POCT SPECIFIC GRAVITY,UA: 1.02
SL AMB POCT URINE PROTEIN: ABNORMAL
SL AMB POCT UROBILINOGEN: 0.2

## 2022-02-10 PROCEDURE — 81003 URINALYSIS AUTO W/O SCOPE: CPT | Performed by: NURSE PRACTITIONER

## 2022-02-10 PROCEDURE — 99213 OFFICE O/P EST LOW 20 MIN: CPT | Performed by: NURSE PRACTITIONER

## 2022-02-10 NOTE — PROGRESS NOTES
2/10/2022      Assessment and Plan    49 y o  female managed by Dr Kirill Yanez    1  Nephrolithiasis  · Status post ureteroscopy with laser lithotripsy 09/30/2021  · Most recent ultrasound kidney and bladder performed 02/02/2022 reveals a possible tiny calculi in the left kidney otherwise unremarkable  · Discussed need to maintain adequate hydration while avoiding high oxalate foods  · Dietary behavior modifications discussed  · Will perform KUB and ultrasound kidney and bladder in 1 year  · Follow up in the office in 1 year    History of Present Illness  Pepper Hsu is a 49 y o  female here for follow up evaluation of  nephrolithiasis  Patient is status post ureteroscopy with laser lithotripsy, 09/30/2021 by Dr Kirill Yanez  Postoperatively, patient has been doing well with no stone recurrence  She denies suprapubic abdominal pain and flank pain  She denies all lower urinary tract symptoms  Patient does admit to limited water intake during the day  She denies changes to his general health since prior office evaluation  Review of Systems   Constitutional: Negative for chills and fever  Respiratory: Negative for cough and shortness of breath  Cardiovascular: Negative for chest pain  Gastrointestinal: Negative for abdominal distention, abdominal pain, blood in stool, nausea and vomiting  Genitourinary: Negative for difficulty urinating, dysuria, enuresis, flank pain, frequency, hematuria and urgency  Skin: Negative for rash         Past Medical History  Past Medical History:   Diagnosis Date    Asthma     History of hepatitis 6/4/2021    Psoriasis     Ureteral calculus        Past Social History  Past Surgical History:   Procedure Laterality Date    FL RETROGRADE PYELOGRAM  9/30/2021    [place] CYSTO/URETERO W/LITHOTRIPSY &INDWELL STENT INSRT Left 9/30/2021    Procedure: CYSTOSCOPY URETEROSCOPY WITH LITHOTRIPSY HOLMIUM LASER, RETROGRADE PYELOGRAM AND INSERTION STENT URETERAL;  Surgeon: Valentin Ritter Rita Alexis MD;  Location: [facility] OR;  Service: Urology     Social History     Tobacco Use   Smoking Status Current Every Day Smoker    Packs/day: 0 20    Types: Cigarettes   Smokeless Tobacco Never Used   Tobacco Comment    down to 1 cig/ day last smoked 9/30/2021       Past Family History  Family History   Problem Relation Age of Onset    Colon cancer Maternal Grandfather     Diabetes Paternal Grandfather     No Known Problems Mother     No Known Problems Father     No Known Problems Sister     No Known Problems Daughter     No Known Problems Maternal Grandmother     No Known Problems Paternal Grandmother        Past Social history  Social History     Socioeconomic History    Marital status: /Civil Union     Spouse name: Not on file    Number of children: Not on file    Years of education: Not on file    Highest education level: Not on file   Occupational History    Not on file   Tobacco Use    Smoking status: Current Every Day Smoker     Packs/day: 0 20     Types: Cigarettes    Smokeless tobacco: Never Used    Tobacco comment: down to 1 cig/ day last smoked 9/30/2021   Vaping Use    Vaping Use: Never used   Substance and Sexual Activity    Alcohol use: Yes     Comment: ocasionally    Drug use: No    Sexual activity: Yes     Partners: Male   Other Topics Concern    Not on file   Social History Narrative    Not on file     Social Determinants of Health     Financial Resource Strain: Medium Risk    Difficulty of Paying Living Expenses: Somewhat hard   Food Insecurity: Food Insecurity Present    Worried About Running Out of Food in the Last Year: Sometimes true    Demi of Food in the Last Year: Sometimes true   Transportation Needs: No Transportation Needs    Lack of Transportation (Medical): No    Lack of Transportation (Non-Medical):  No   Physical Activity: Not on file   Stress: Not on file   Social Connections: Not on file   Intimate Partner Violence: Not on file   Housing Stability: Low Risk     Unable to Pay for Housing in the Last Year: No    Number of Places Lived in the Last Year: 1    Unstable Housing in the Last Year: No       Current Medications  Current Outpatient Medications   Medication Sig Dispense Refill    acetaminophen (TYLENOL) 325 mg tablet Take 650 mg by mouth every 6 (six) hours as needed for mild pain      albuterol (PROVENTIL HFA,VENTOLIN HFA) 90 mcg/act inhaler Inhale 2 puffs every 6 (six) hours as needed       budesonide-formoterol (SYMBICORT) 80-4 5 MCG/ACT inhaler Inhale 2 puffs 2 (two) times a day Rinse mouth after use  10 2 g 3    buPROPion (WELLBUTRIN SR) 150 mg 12 hr tablet Take 1 tablet (150 mg total) by mouth 2 (two) times a day (Patient taking differently: Take 150 mg by mouth 2 (two) times a day Only takes daily) 60 tablet 2    diphenhydrAMINE (BENADRYL) 50 mg capsule Take 1 capsule (50 mg total) by mouth 60 minutes pre-procedure 30 capsule 0    estrogen, conjugated,-medroxyprogesterone (PREMPRO) 0 3-1 5 MG per tablet Take 1 tablet by mouth daily 30 tablet 3    ibuprofen (MOTRIN) 600 mg tablet Take 1 tablet (600 mg total) by mouth every 6 (six) hours as needed for mild pain 30 tablet 0    oxybutynin (DITROPAN) 5 mg tablet Take 1 tablet (5 mg total) by mouth 3 (three) times a day as needed (frequency//spasms) 30 tablet 0    predniSONE 50 mg tablet Take 1 tablet (50 mg total) by mouth see administration instructions Take 50mg 13 hours prior to procedure  Take 50 mg 7 hours prior to procedure  Take 50 mg 1 hour prior to procedure  (Patient not taking: Reported on 9/9/2021) 3 tablet 0    tamsulosin (FLOMAX) 0 4 mg Take 1 capsule (0 4 mg total) by mouth daily with dinner for 14 days 14 capsule 0    valACYclovir (VALTREX) 1,000 mg tablet Take 1 tablet (1,000 mg total) by mouth 2 (two) times a day for 7 days (Patient not taking: Reported on 10/7/2021) 14 tablet 0     No current facility-administered medications for this visit  Allergies  Allergies   Allergen Reactions    Dye [Iodinated Diagnostic Agents] Swelling         The following portions of the patient's history were reviewed and updated as appropriate: allergies, current medications, past medical history, past social history, past surgical history and problem list       Vitals  Vitals:    02/10/22 1305   BP: 110/80   Pulse: 99   Weight: 51 7 kg (114 lb)   Height: 5' (1 524 m)           Physical Exam  Physical Exam  Vitals reviewed  Constitutional:       General: She is not in acute distress  Appearance: Normal appearance  Cardiovascular:      Heart sounds: Normal heart sounds  Pulmonary:      Effort: Pulmonary effort is normal  No respiratory distress  Breath sounds: Normal breath sounds  Abdominal:      Tenderness: There is no right CVA tenderness or left CVA tenderness  Musculoskeletal:         General: Normal range of motion  Skin:     General: Skin is warm and dry  Neurological:      General: No focal deficit present  Mental Status: She is alert     Psychiatric:         Mood and Affect: Mood normal          Behavior: Behavior normal        Results  Recent Results (from the past 1 hour(s))   POCT urine dip auto non-scope    Collection Time: 02/10/22  1:17 PM   Result Value Ref Range     COLOR,UA YELLOW     CLARITY,UA CLEAR     SPECIFIC GRAVITY,UA 1 025      PH,UA 5 5     LEUKOCYTE ESTERASE,UA NEG     NITRITE,UA NEG     GLUCOSE, UA NEG     KETONES,UA NEG     BILIRUBIN,UA NEG     BLOOD,UA SMALL     POCT URINE PROTEIN NEG     SL AMB POCT UROBILINOGEN 0 2    ]  No results found for: PSA  Lab Results   Component Value Date    CALCIUM 9 7 06/14/2021    K 4 1 06/14/2021    CO2 31 (H) 06/14/2021     06/14/2021    BUN 15 06/14/2021    CREATININE 0 57 (L) 06/14/2021     Lab Results   Component Value Date    WBC 7 60 06/14/2021    HGB 15 3 06/14/2021    HCT 45 1 06/14/2021    MCV 96 06/14/2021     06/14/2021     Orders  Orders Placed This Encounter   Procedures    US kidney and bladder     Standing Status:   Future     Standing Expiration Date:   2/10/2026     Scheduling Instructions:      "Prep required if being scheduled in conjunction with other studies, refer to those examination's Preps first before scheduling  All patients for US Kidney and Bladder they must drink 24 oz of water 60 minutes before your scheduled appointment time  This test requires you to have a FULL bladder  Please do not urinate before your test             Please bring your physician order, insurance cards, a form of photo ID and a list of your medications with you  Arrive 15 minutes prior to your appointment time in order to register  If you need to have lab work or a urinalysis, please do this AFTER your ultrasound  "     Order Specific Question:   Reason for Exam:     Answer:   nephrolithiasis     Order Specific Question:   Is the patient pregnant? Answer:   No    XR abdomen 1 view kub     Standing Status:   Future     Standing Expiration Date:   2/10/2026     Scheduling Instructions:      Bring along any outside films relating to this procedure  Order Specific Question:   Is the patient pregnant?      Answer:   No    POCT urine dip auto non-scope       BAILEY Glez

## 2022-02-10 NOTE — PATIENT INSTRUCTIONS
Low Oxalate Diet   WHAT YOU NEED TO KNOW:   A low-oxalate diet is a meal plan that is low in oxalate  Oxalate is a chemical found in plant foods  You may need to eat foods that are low in oxalate to help clear kidney stones or prevent them from forming  People who have had kidney stones are at a higher risk of forming kidney stones again  The most common type of kidney stone is made up of crystals that contain calcium and oxalate  Your healthcare provider or dietitian may recommend that you limit oxalate if you get this type of kidney stone often  DISCHARGE INSTRUCTIONS:   Foods to include: Include the following foods that have a low to medium amount of oxalate  · Grains:      ? Egg noodles    ? Janes crackers    ? Pancakes and waffles    ? Cooked and dry cereals without nuts or bran    ? White or wild rice    ? White bread, cornbread, bagels, and white English muffins (medium oxalate)    ? Saltine or soda crackers and vanilla wafers (medium oxalate)    ? Brown rice, spaghetti, and other noodles and pastas (medium oxalate)    · Fruit:      ? Apples, bananas, grapes    ? Grapefruit and cranberries    ? Peaches, nectarines, apricots, and pears    ? Papayas and strawberries    ? Melons and pineapples    ? Blackberries, blueberries, mangoes, and prunes (medium oxalate)    · Vegetables:      ? Artichokes, asparagus, and brussels sprouts    ? Broccoli and cauliflower    ? Kale, endive, cabbage, and lettuce    ? Cucumbers, peas, and zucchini    ? Mushrooms, onions, and peppers    ? Potatoes and corn    ? Carrots, celery, and green beans (medium oxalate)    ? Parsnips, summer squash, tomatoes, and turnips (medium oxalate)    · Dairy:      ? American cheese, Swiss cheese, cottage cheese, ricotta cheese, and cheddar cheese    ? Milk and buttermilk    ? Yogurt    · Protein foods:      ? Meat, fish, shellfish, chicken, and turkey    ? Lunch meat and ham (medium oxalate)    ?  Hot dogs, bratwurst, rogers, and sausage (medium oxalate)    · Drinks and desserts:      ? Coffee    ? Fruit punch and lemonade or limeade without added vitamin C    · Desserts:      ? Cookies, cakes, and ice cream    ? Pudding without chocolate    Foods to limit or avoid:  Limit the following foods that are high in oxalate  · Grains:      ? Wheat bran, wheat germ, and barley    ? Grits and bran cereal    ? White corn flour and buckwheat flour    ? Whole wheat bread    · Fruit:      ? Dried apricots    ? Red currants, figs, and rhubarb    ? Kiwi    · Vegetables:      ? Tamri greens, leeks, okra, and spinach    ? Wax beans     ? Eggplant    ? Beets and beet greens    ? Swiss chard, escarole, parsley, and rutabagas    ? Tomato paste    · Protein foods:      ? Baked beans with tomato sauce    ? Nut butters and nuts (peanuts, almonds, pecans, cashews, hazelnuts)    ? Soy burgers    ? Miso    ? Dried beans    · Desserts:      ? Fruitcake    ? Chocolate    ? Carob and marmalade    · Beverages:      ? Chocolate drink mixes    ? Soy milk    ? Instant iced tea    · Other foods:      ? Sesame seeds and tahini (paste made of sesame seeds)    ? Poppy seeds    Other dietary guidelines:   · Drink about 12 to 16 (eight-ounce) cups of liquid each day  Liquids help clear kidney stones and prevent them from forming again  Water is the best liquid to drink  You may need more liquid if you are physically active  Ask your healthcare provider or dietitian how much liquid you need to drink each day  · Your healthcare provider may suggest that you make other diet changes to help prevent kidney stones  This may include decreasing the amount of sodium you eat each day  © Copyright [employer] 2021 Information is for End User's use only and may not be sold, redistributed or otherwise used for commercial purposes   All illustrations and images included in CareNotes® are the copyrighted property of [employer]  or Ryan Chapman  The above information is an  only  It is not intended as medical advice for individual conditions or treatments  Talk to your doctor, nurse or pharmacist before following any medical regimen to see if it is safe and effective for you

## 2022-02-14 ENCOUNTER — TELEPHONE (OUTPATIENT)
Dept: OTHER | Facility: OTHER | Age: 57
End: 2022-02-14

## 2022-02-14 NOTE — TELEPHONE ENCOUNTER
Patient was calling to get clarification as to when she should get the xray and [address]  It looks like she is supposed to get these near the time of her return visit in a year, but she just wanted to make sure   Please advise

## 2022-03-17 ENCOUNTER — OFFICE VISIT (OUTPATIENT)
Dept: FAMILY MEDICINE CLINIC | Facility: CLINIC | Age: 57
End: 2022-03-17
Payer: COMMERCIAL

## 2022-03-17 ENCOUNTER — HOSPITAL ENCOUNTER (OUTPATIENT)
Dept: RADIOLOGY | Facility: HOSPITAL | Age: 57
Discharge: HOME | End: 2022-03-17
Payer: COMMERCIAL

## 2022-03-17 VITALS
OXYGEN SATURATION: 97 % | SYSTOLIC BLOOD PRESSURE: 134 MMHG | BODY MASS INDEX: 22.46 KG/M2 | TEMPERATURE: 98 F | WEIGHT: 114.4 LBS | DIASTOLIC BLOOD PRESSURE: 72 MMHG | HEART RATE: 92 BPM | HEIGHT: 60 IN

## 2022-03-17 DIAGNOSIS — M79.642 LEFT HAND PAIN: ICD-10-CM
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
## 2022-03-17 DIAGNOSIS — S60.512A CAT SCRATCH OF LEFT HAND, INITIAL ENCOUNTER: Primary | ICD-10-CM

## 2022-03-17 PROBLEM — Z23 ENCOUNTER FOR IMMUNIZATION: Status: RESOLVED | Noted: 2021-07-09 | Resolved: 2022-03-17

## 2022-03-17 PROCEDURE — 99214 OFFICE O/P EST MOD 30 MIN: CPT | Performed by: PHYSICIAN ASSISTANT

## 2022-03-17 PROCEDURE — 73130 X-RAY EXAM OF HAND: CPT

## 2022-03-17 RX ORDER — AMOXICILLIN AND CLAVULANATE POTASSIUM 875; 125 MG/1; MG/1
1 TABLET, FILM COATED ORAL EVERY 12 HOURS SCHEDULED
Qty: 10 TABLET | Refills: 0 | Status: SHIPPED | OUTPATIENT
Start: 2022-03-17 | End: 2022-03-22

## 2022-03-17 RX ORDER — AZITHROMYCIN 250 MG/1
TABLET, FILM COATED ORAL
Qty: 6 TABLET | Refills: 0 | Status: SHIPPED | OUTPATIENT
Start: 2022-03-17 | End: 2022-03-17 | Stop reason: ALTCHOICE

## 2022-03-17 NOTE — ASSESSMENT & PLAN NOTE
Pt injured her hand when it hit a wall and was also scratched by a feral cat  Hand has been swollen with reduced ROM  Pt admits to taking 2 days worth of augmentin and prednisone, which has helped her swelling  On exam, hand is swollen, erythematous, warm  A few small scratches seem to be healing appropriately  Point tenderness in several locations on the hand  XR L hand to evaluate for fracture  5 more days of augmentin prescribed  Advised pt to call the office if not improved in a few days  Also can use ice for pain control and to help with swelling

## 2022-03-17 NOTE — PROGRESS NOTES
Assessment/Plan:    Left hand pain  Pt injured her hand when it hit a wall and was also scratched by a feral cat  Hand has been swollen with reduced ROM  Pt admits to taking 2 days worth of augmentin and prednisone, which has helped her swelling  On exam, hand is swollen, erythematous, warm  A few small scratches seem to be healing appropriately  Point tenderness in several locations on the hand  XR L hand to evaluate for fracture  5 more days of augmentin prescribed  Advised pt to call the office if not improved in a few days  Also can use ice for pain control and to help with swelling  Diagnoses and all orders for this visit:    Cat scratch of left hand, initial encounter  -     Discontinue: azithromycin (Zithromax) 250 mg tablet; Take 2 tablets (500 mg total) by mouth daily for 1 day, THEN 1 tablet (250 mg total) daily for 4 days  -     amoxicillin-clavulanate (Augmentin) 875-125 mg per tablet; Take 1 tablet by mouth every 12 (twelve) hours for 5 days    Left hand pain  -     XR hand 3+ vw left; Future          Subjective:      Patient ID: Rob Blank is a 49 y o  female  Bunny Chavez presents to the office today for evaluation of L hand injury  The following portions of the patient's history were reviewed and updated as appropriate: allergies, current medications, past family history, past medical history, past social history, past surgical history and problem list     Review of Systems   Constitutional: Negative for chills, fatigue and fever  HENT: Negative for congestion, rhinorrhea, sinus pressure, sinus pain, sneezing and sore throat  Respiratory: Negative for cough, shortness of breath and wheezing  Cardiovascular: Negative for chest pain, palpitations and leg swelling  Gastrointestinal: Negative for abdominal pain, constipation, diarrhea, nausea and vomiting  Genitourinary: Negative for dysuria and hematuria     Musculoskeletal: Positive for arthralgias (L hand) and joint swelling (L hand/wrist)  Negative for back pain, myalgias, neck pain and neck stiffness  Skin: Positive for wound (L hand)  Neurological: Negative for dizziness, seizures and headaches  Objective:      /72 (BP Location: Left arm, Patient Position: Sitting, Cuff Size: Large)   Pulse 92   Temp 98 °F (36 7 °C)   Ht 5' (1 524 m)   Wt 51 9 kg (114 lb 6 4 oz)   SpO2 97%   BMI 22 34 kg/m²          Physical Exam  Vitals reviewed  Constitutional:       General: She is not in acute distress  Appearance: Normal appearance  She is not toxic-appearing  HENT:      Head: Normocephalic and atraumatic  Eyes:      Extraocular Movements: Extraocular movements intact  Conjunctiva/sclera: Conjunctivae normal    Cardiovascular:      Rate and Rhythm: Normal rate and regular rhythm  Pulses: Normal pulses  Heart sounds: Normal heart sounds  No murmur heard  No friction rub  No gallop  Pulmonary:      Effort: Pulmonary effort is normal  No respiratory distress  Breath sounds: Normal breath sounds  No stridor  No wheezing, rhonchi or rales  Musculoskeletal:      Left hand: Swelling, laceration, tenderness and bony tenderness present  No deformity  Decreased range of motion  Normal pulse  Right lower leg: No edema  Left lower leg: No edema  Skin:     General: Skin is warm and dry  Neurological:      Mental Status: She is alert and oriented to person, place, and time  Psychiatric:         Mood and Affect: Mood normal          Behavior: Behavior normal          Thought Content:  Thought content normal

## 2022-03-18 ENCOUNTER — TELEPHONE (OUTPATIENT)
Dept: FAMILY MEDICINE CLINIC | Facility: CLINIC | Age: 57
End: 2022-03-18

## 2022-03-21 ENCOUNTER — TELEPHONE (OUTPATIENT)
Dept: FAMILY MEDICINE CLINIC | Facility: CLINIC | Age: 57
End: 2022-03-21

## 2022-04-08 NOTE — TELEPHONE ENCOUNTER
Please call patient, x-ray shows only inflammation, no arthritis  If she persisted with pain she needs to go to physical therapy  No

## 2022-08-11 ENCOUNTER — CLINICAL SUPPORT (OUTPATIENT)
Dept: FAMILY MEDICINE CLINIC | Facility: CLINIC | Age: 57
End: 2022-08-11

## 2022-08-11 DIAGNOSIS — Z71.84 TRAVEL ADVICE ENCOUNTER: Primary | ICD-10-CM

## 2022-08-11 PROCEDURE — U0003 INFECTIOUS AGENT DETECTION BY NUCLEIC ACID (DNA OR RNA); SEVERE ACUTE RESPIRATORY SYNDROME CORONAVIRUS 2 (SARS-COV-2) (CORONAVIRUS DISEASE [COVID-19]), AMPLIFIED PROBE TECHNIQUE, MAKING USE OF HIGH THROUGHPUT TECHNOLOGIES AS DESCRIBED BY CMS-2020-01-R: HCPCS | Performed by: FAMILY MEDICINE

## 2022-08-11 PROCEDURE — U0005 INFEC AGEN DETEC AMPLI PROBE: HCPCS | Performed by: FAMILY MEDICINE

## 2022-08-12 LAB — SARS-COV-2 RNA RESP QL NAA+PROBE: NEGATIVE

## 2022-09-06 ENCOUNTER — OFFICE VISIT (OUTPATIENT)
Dept: FAMILY MEDICINE CLINIC | Facility: CLINIC | Age: 57
End: 2022-09-06
Payer: COMMERCIAL

## 2022-09-06 VITALS
RESPIRATION RATE: 16 BRPM | DIASTOLIC BLOOD PRESSURE: 70 MMHG | BODY MASS INDEX: 21.99 KG/M2 | SYSTOLIC BLOOD PRESSURE: 120 MMHG | HEART RATE: 88 BPM | TEMPERATURE: 98 F | WEIGHT: 112 LBS | OXYGEN SATURATION: 97 % | HEIGHT: 60 IN

## 2022-09-06 DIAGNOSIS — J45.20 MILD INTERMITTENT ASTHMA WITHOUT COMPLICATION: ICD-10-CM

## 2022-09-06 DIAGNOSIS — R53.83 FATIGUE, UNSPECIFIED TYPE: ICD-10-CM

## 2022-09-06 DIAGNOSIS — F17.200 SMOKER: ICD-10-CM

## 2022-09-06 DIAGNOSIS — Z83.3 FAMILY HISTORY OF DIABETES MELLITUS (DM): ICD-10-CM

## 2022-09-06 DIAGNOSIS — Z80.0 FAMILY HISTORY OF COLON CANCER: ICD-10-CM

## 2022-09-06 DIAGNOSIS — Z12.31 BREAST CANCER SCREENING BY MAMMOGRAM: ICD-10-CM

## 2022-09-06 DIAGNOSIS — Z00.01 ENCOUNTER FOR GENERAL ADULT MEDICAL EXAMINATION WITH ABNORMAL FINDINGS: Primary | ICD-10-CM

## 2022-09-06 PROCEDURE — 99396 PREV VISIT EST AGE 40-64: CPT | Performed by: FAMILY MEDICINE

## 2022-09-06 RX ORDER — BUDESONIDE AND FORMOTEROL FUMARATE DIHYDRATE 80; 4.5 UG/1; UG/1
2 AEROSOL RESPIRATORY (INHALATION) 2 TIMES DAILY
Qty: 10.2 G | Refills: 3 | Status: SHIPPED | OUTPATIENT
Start: 2022-09-06

## 2022-09-06 RX ORDER — ALBUTEROL SULFATE 90 UG/1
2 AEROSOL, METERED RESPIRATORY (INHALATION) EVERY 6 HOURS PRN
Qty: 18 G | Refills: 5 | Status: SHIPPED | OUTPATIENT
Start: 2022-09-06

## 2022-09-06 NOTE — PROGRESS NOTES
Assessment/Plan:    Golden Felix is 56y o  years old, asymptomatic from lung cancer; with smoking status current , she has a 30 pack year smoking history  she  understands CT lung screening goals  We discussed about the possible diagnosis from the screening, the false positive rate and the total radiation exposure  Also she understood about importance of compliance annual lung cancer low dose CT screening  Golden Felix decided to undergo diagnosis and treatment if needed  Patient  was encouraged to quit smoking,  I offered smoking cessation that she declined  No problem-specific Assessment & Plan notes found for this encounter  Diagnoses and all orders for this visit:    Encounter for general adult medical examination with abnormal findings  -     Comprehensive metabolic panel; Future  -     Lipid panel; Future    Breast cancer screening by mammogram  -     Mammo screening bilateral w 3d & cad; Future    Fatigue, unspecified type  -     CBC and differential; Future    Family history of diabetes mellitus (DM)  -     HEMOGLOBIN A1C W/ EAG ESTIMATION; Future    Family history of colon cancer  -     Cologuard    Smoker 30 pk-year  -     CT lung screening program; Future          Subjective:      Patient ID: Lida Maxwell is a 56 y o  female  HPI  Patient is here for PE, not having any acute distress  The following portions of the patient's history were reviewed and updated as appropriate: allergies, current medications, past family history, past medical history, past social history, past surgical history and problem list     Review of Systems   Constitutional: Negative for diaphoresis, fatigue, fever and unexpected weight change  Respiratory: Negative for cough, chest tightness, shortness of breath and wheezing  Cardiovascular: Negative for chest pain, palpitations and leg swelling  Gastrointestinal: Negative for abdominal pain, blood in stool and constipation     Neurological: Negative for dizziness, syncope, light-headedness and headaches  Hematological: Does not bruise/bleed easily  Psychiatric/Behavioral: Negative for behavioral problems, self-injury and sleep disturbance  The patient is not nervous/anxious  Objective:      /70 (BP Location: Left arm, Patient Position: Sitting, Cuff Size: Standard)   Pulse 88   Temp 98 °F (36 7 °C) (Temporal)   Resp 16   Ht 5' (1 524 m)   Wt 50 8 kg (112 lb)   SpO2 97%   Breastfeeding No   BMI 21 87 kg/m²          Physical Exam  Cardiovascular:      Rate and Rhythm: Normal rate and regular rhythm  Pulmonary:      Effort: Pulmonary effort is normal       Breath sounds: Normal breath sounds  Musculoskeletal:         General: Normal range of motion  Cervical back: Neck supple  Neurological:      General: No focal deficit present  Mental Status: She is alert and oriented to person, place, and time     Psychiatric:         Behavior: Behavior normal

## 2022-09-07 ENCOUNTER — APPOINTMENT (OUTPATIENT)
Dept: LAB | Facility: CLINIC | Age: 57
End: 2022-09-07
Payer: COMMERCIAL

## 2022-09-08 NOTE — PROGRESS NOTES
Assessment/Plan:     No problem-specific Assessment & Plan notes found for this encounter  Diagnoses and all orders for this visit:    Encounter for gynecological examination (general) (routine) without abnormal findings    Hot flashes        Depression Screening Follow-up Plan: Patient's depression screening was positive with a PHQ-2 score of 0  Their PHQ-9 score was 2  Clinically patient does not have depression  No treatment is required  Tobacco Cessation Counseling: Tobacco cessation counseling and education was provided  The patient is sincerely urged to quit consumption of tobacco  She is not ready to quit tobacco  The numerous health risks of tobacco consumption were discussed  If she decides to quit, there are a number of helpful adjunctive aids, and she can see me to discuss nicotine replacement therapy, chantix, or bupropion anytime in the future  She only smokes 3 cigs a day  Subjective:      Patient ID: Gema Espinal is a 56 y o  female who presents for annual exam Her last pap was 08/24/21 and was negative with negative HPV  Her mammogram is ordered  She smokes cigs 3/day  She c/o hot flashes  Is not a candidate for hormones  She is willing to try OTC preparations- advised amberen or estroven  She is  and works night shift as a psych nurse  HPI    The following portions of the patient's history were reviewed and updated as appropriate: allergies, current medications, past family history, past medical history, past social history, past surgical history and problem list     Review of Systems      Objective:      /75   Pulse (!) 112   Ht 5' (1 524 m)   Wt 50 9 kg (112 lb 3 2 oz)   LMP  (LMP Unknown)   BMI 21 91 kg/m²          Physical Exam  Vitals and nursing note reviewed  Exam conducted with a chaperone present  Constitutional:       General: She is not in acute distress  Appearance: She is well-developed  HENT:      Head: Normocephalic     Neck: Thyroid: No thyromegaly  Cardiovascular:      Rate and Rhythm: Normal rate and regular rhythm  Heart sounds: Normal heart sounds  No murmur heard  Pulmonary:      Effort: Pulmonary effort is normal  No respiratory distress  Breath sounds: Normal breath sounds  No wheezing or rales  Chest:      Chest wall: No tenderness  Breasts:      Right: No swelling, bleeding, inverted nipple, mass, nipple discharge, skin change or tenderness  Left: No swelling, bleeding, inverted nipple, mass, nipple discharge, skin change or tenderness  Abdominal:      General: Bowel sounds are normal  There is no distension  Palpations: Abdomen is soft  There is no mass  Tenderness: There is no abdominal tenderness  There is no guarding or rebound  Genitourinary:     Labia:         Right: No rash, tenderness, lesion or injury  Left: No rash, tenderness, lesion or injury  Vagina: Normal       Cervix: Normal       Uterus: Normal        Adnexa:         Right: No mass, tenderness or fullness  Left: No mass, tenderness or fullness  Rectum: No external hemorrhoid  Skin:     General: Skin is warm and dry  Neurological:      Mental Status: She is alert and oriented to person, place, and time  Psychiatric:         Behavior: Behavior normal          Thought Content:  Thought content normal          Judgment: Judgment normal

## 2022-09-13 ENCOUNTER — ANNUAL EXAM (OUTPATIENT)
Dept: OBGYN CLINIC | Facility: CLINIC | Age: 57
End: 2022-09-13

## 2022-09-13 VITALS
HEIGHT: 60 IN | HEART RATE: 112 BPM | WEIGHT: 112.2 LBS | BODY MASS INDEX: 22.03 KG/M2 | DIASTOLIC BLOOD PRESSURE: 75 MMHG | SYSTOLIC BLOOD PRESSURE: 119 MMHG

## 2022-09-13 DIAGNOSIS — Z01.419 ENCOUNTER FOR GYNECOLOGICAL EXAMINATION (GENERAL) (ROUTINE) WITHOUT ABNORMAL FINDINGS: Primary | ICD-10-CM

## 2022-09-13 DIAGNOSIS — R23.2 HOT FLASHES: ICD-10-CM

## 2022-09-13 PROCEDURE — 99396 PREV VISIT EST AGE 40-64: CPT | Performed by: OBSTETRICS & GYNECOLOGY

## 2023-05-18 ENCOUNTER — APPOINTMENT (EMERGENCY)
Dept: RADIOLOGY | Facility: HOSPITAL | Age: 58
End: 2023-05-18

## 2023-05-18 ENCOUNTER — HOSPITAL ENCOUNTER (EMERGENCY)
Facility: HOSPITAL | Age: 58
Discharge: HOME | End: 2023-05-19
Attending: EMERGENCY MEDICINE

## 2023-05-18 DIAGNOSIS — R06.02 SOB (SHORTNESS OF BREATH): Primary | ICD-10-CM

## 2023-05-18 DIAGNOSIS — R05.9 COUGH: ICD-10-CM

## 2023-05-18 DIAGNOSIS — J45.909 REACTIVE AIRWAY DISEASE: ICD-10-CM

## 2023-05-18 DIAGNOSIS — J45.20 MILD INTERMITTENT ASTHMA WITHOUT COMPLICATION: ICD-10-CM

## 2023-05-18 LAB
ANION GAP SERPL CALCULATED.3IONS-SCNC: 0 MMOL/L (ref 4–13)
BASOPHILS # BLD AUTO: 0.01 THOUSANDS/ÂΜL (ref 0–0.1)
BASOPHILS NFR BLD AUTO: 0 % (ref 0–1)
BUN SERPL-MCNC: 14 MG/DL (ref 5–25)
CALCIUM SERPL-MCNC: 8.9 MG/DL (ref 8.3–10.1)
CHLORIDE SERPL-SCNC: 109 MMOL/L (ref 96–108)
CO2 SERPL-SCNC: 27 MMOL/L (ref 21–32)
CREAT SERPL-MCNC: 0.68 MG/DL (ref 0.6–1.3)
EOSINOPHIL # BLD AUTO: 0.07 THOUSAND/ÂΜL (ref 0–0.61)
EOSINOPHIL NFR BLD AUTO: 1 % (ref 0–6)
ERYTHROCYTE [DISTWIDTH] IN BLOOD BY AUTOMATED COUNT: 12.3 % (ref 11.6–15.1)
GFR SERPL CREATININE-BSD FRML MDRD: 98 ML/MIN/1.73SQ M
GLUCOSE SERPL-MCNC: 108 MG/DL (ref 65–140)
HCT VFR BLD AUTO: 49.8 % (ref 34.8–46.1)
HGB BLD-MCNC: 16.6 G/DL (ref 11.5–15.4)
IMM GRANULOCYTES # BLD AUTO: 0.03 THOUSAND/UL (ref 0–0.2)
IMM GRANULOCYTES NFR BLD AUTO: 0 % (ref 0–2)
LYMPHOCYTES # BLD AUTO: 1.46 THOUSANDS/ÂΜL (ref 0.6–4.47)
LYMPHOCYTES NFR BLD AUTO: 18 % (ref 14–44)
MCH RBC QN AUTO: 31.9 PG (ref 26.8–34.3)
MCHC RBC AUTO-ENTMCNC: 33.3 G/DL (ref 31.4–37.4)
MCV RBC AUTO: 96 FL (ref 82–98)
MONOCYTES # BLD AUTO: 0.84 THOUSAND/ÂΜL (ref 0.17–1.22)
MONOCYTES NFR BLD AUTO: 10 % (ref 4–12)
NEUTROPHILS # BLD AUTO: 5.82 THOUSANDS/ÂΜL (ref 1.85–7.62)
NEUTS SEG NFR BLD AUTO: 71 % (ref 43–75)
NRBC BLD AUTO-RTO: 0 /100 WBCS
PLATELET # BLD AUTO: 235 THOUSANDS/UL (ref 149–390)
PMV BLD AUTO: 10.4 FL (ref 8.9–12.7)
POTASSIUM SERPL-SCNC: 4 MMOL/L (ref 3.5–5.3)
RBC # BLD AUTO: 5.2 MILLION/UL (ref 3.81–5.12)
SODIUM SERPL-SCNC: 136 MMOL/L (ref 135–147)
WBC # BLD AUTO: 8.23 THOUSAND/UL (ref 4.31–10.16)

## 2023-05-18 RX ORDER — PREDNISONE 20 MG/1
40 TABLET ORAL ONCE
Status: COMPLETED | OUTPATIENT
Start: 2023-05-18 | End: 2023-05-18

## 2023-05-18 RX ORDER — SODIUM CHLORIDE FOR INHALATION 0.9 %
12 VIAL, NEBULIZER (ML) INHALATION ONCE
Status: COMPLETED | OUTPATIENT
Start: 2023-05-18 | End: 2023-05-18

## 2023-05-18 RX ADMIN — PREDNISONE 40 MG: 20 TABLET ORAL at 23:00

## 2023-05-18 RX ADMIN — IPRATROPIUM BROMIDE 1 MG: 0.5 SOLUTION RESPIRATORY (INHALATION) at 23:00

## 2023-05-18 RX ADMIN — Medication 12 ML: at 23:00

## 2023-05-18 RX ADMIN — ALBUTEROL SULFATE 10 MG: 2.5 SOLUTION RESPIRATORY (INHALATION) at 23:00

## 2023-05-18 NOTE — Clinical Note
Shahbaz Morillo was seen and treated in our emergency department on 5/18/2023  Diagnosis:     Saundra Esteban  is off the rest of the shift today  She may return on this date: If you have any questions or concerns, please don't hesitate to call        Zoraida Francisco, DO    ______________________________           _______________          _______________  Hospital Representative                              Date                                Time

## 2023-05-19 VITALS
TEMPERATURE: 99.1 F | DIASTOLIC BLOOD PRESSURE: 70 MMHG | HEART RATE: 132 BPM | SYSTOLIC BLOOD PRESSURE: 148 MMHG | OXYGEN SATURATION: 100 % | RESPIRATION RATE: 22 BRPM

## 2023-05-19 LAB
ATRIAL RATE: 102 BPM
CARDIAC TROPONIN I PNL SERPL HS: 3 NG/L
P AXIS: 69 DEGREES
PR INTERVAL: 170 MS
QRS AXIS: 192 DEGREES
QRSD INTERVAL: 98 MS
QT INTERVAL: 318 MS
QTC INTERVAL: 414 MS
T WAVE AXIS: 52 DEGREES
VENTRICULAR RATE: 102 BPM

## 2023-05-19 RX ORDER — PREDNISONE 20 MG/1
40 TABLET ORAL DAILY
Qty: 14 TABLET | Refills: 0 | Status: SHIPPED | OUTPATIENT
Start: 2023-05-19 | End: 2023-05-26 | Stop reason: ALTCHOICE

## 2023-05-19 RX ORDER — BUDESONIDE AND FORMOTEROL FUMARATE DIHYDRATE 80; 4.5 UG/1; UG/1
2 AEROSOL RESPIRATORY (INHALATION) 2 TIMES DAILY
Qty: 10.2 G | Refills: 0 | Status: SHIPPED | OUTPATIENT
Start: 2023-05-19

## 2023-05-19 RX ORDER — ACETAMINOPHEN 325 MG/1
650 TABLET ORAL ONCE
Status: COMPLETED | OUTPATIENT
Start: 2023-05-19 | End: 2023-05-19

## 2023-05-19 RX ORDER — AZITHROMYCIN 250 MG/1
TABLET, FILM COATED ORAL
Qty: 6 TABLET | Refills: 0 | Status: SHIPPED | OUTPATIENT
Start: 2023-05-19 | End: 2023-05-23

## 2023-05-19 RX ORDER — AZITHROMYCIN 250 MG/1
500 TABLET, FILM COATED ORAL ONCE
Status: COMPLETED | OUTPATIENT
Start: 2023-05-19 | End: 2023-05-19

## 2023-05-19 RX ADMIN — AZITHROMYCIN MONOHYDRATE 500 MG: 250 TABLET ORAL at 01:25

## 2023-05-19 RX ADMIN — ACETAMINOPHEN 650 MG: 325 TABLET ORAL at 01:25

## 2023-05-19 NOTE — ED PROVIDER NOTES
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 Taking? albuterol (PROVENTIL HFA,VENTOLIN HFA) 90 mcg/act inhaler   No No   Sig: Inhale 2 puffs every 6 (six) hours as needed for wheezing   budesonide-formoterol (SYMBICORT) 80-4 5 MCG/ACT inhaler   No No   Sig: Inhale 2 puffs 2 (two) times a day Rinse mouth after use  budesonide-formoterol (SYMBICORT) 80-4 5 MCG/ACT inhaler   No Yes   Sig: Inhale 2 puffs 2 (two) times a day Rinse mouth after use        Facility-Administered Medications: None       Past Medical History:   Diagnosis Date   • Asthma    • History of hepatitis 06/04/2021   • Infectious viral hepatitis    • Kidney stone    • Psoriasis    • Ureteral calculus        Past Surgical History:   Procedure Laterality Date   • FL RETROGRADE PYELOGRAM  9/30/2021   • TX CYSTO/URETERO W/LITHOTRIPSY &INDWELL STENT INSRT Left 9/30/2021    Procedure: CYSTOSCOPY URETEROSCOPY WITH LITHOTRIPSY HOLMIUM LASER, RETROGRADE PYELOGRAM AND INSERTION STENT URETERAL;  Surgeon: Madhuri Howe MD;  Location: [facility];  Service: Urology       Family History   Problem Relation Age of Onset   • Colon cancer Maternal Grandfather    • Diabetes Paternal Grandfather    • No Known Problems Mother    • No Known Problems Father    • No Known Problems Sister    • No Known Problems Daughter    • No Known Problems Maternal Grandmother    • No Known Problems Paternal Grandmother      I have reviewed and agree with the history as documented  E-Cigarette/Vaping   • E-Cigarette Use Never User      E-Cigarette/Vaping Substances   • Nicotine No    • THC No    • CBD No    • Flavoring No    • Other No    • Unknown No      Social History     Tobacco Use   • Smoking status: Every Day     Packs/day: 0 20     Types: Cigarettes   • Smokeless tobacco: Never   • Tobacco comments:     down to 1 cig/ day last smoked 9/30/2021   Vaping Use   • Vaping Use: Never used   Substance Use Topics   • Alcohol use: Not Currently     Comment: ocasionally   • Drug use: No        Review of Systems   Constitutional: Negative for chills and fever  HENT: Negative for sore throat  Respiratory: Positive for cough, chest tightness, shortness of breath and wheezing  Cardiovascular: Positive for chest pain  Negative for palpitations and leg swelling  Gastrointestinal: Negative for abdominal pain, diarrhea, nausea and vomiting  Genitourinary: Negative for dysuria and frequency  Musculoskeletal: Negative for myalgias  Skin: Negative for rash and wound  Neurological: Negative for dizziness, light-headedness and headaches  All other systems reviewed and are negative        Physical Exam  ED Triage Vitals   Temperature Pulse Respirations Blood Pressure SpO2   05/18/23 2223 05/18/23 2223 05/18/23 2223 05/18/23 2223 05/18/23 2223   99 1 °F (37 3 °C) (!) 110 (!) 24 (!) 184/99 96 %      Temp Source Heart Rate Source Patient Position - Orthostatic VS BP Location FiO2 (%)   05/18/23 2223 05/18/23 2223 05/18/23 2315 05/18/23 2315 --   Oral Monitor Sitting Left arm       Pain Score       05/19/23 0125       6             Orthostatic Vital Signs  Vitals:    05/18/23 2315 05/18/23 2330 05/19/23 0000 05/19/23 0030   BP: 170/87 164/83 162/75 148/70   Pulse: 101 (!) 106 (!) 119 (!) 132   Patient Position - Orthostatic VS: Sitting  Sitting        Physical Exam  Vitals and nursing note reviewed  Constitutional:       General: She is not in acute distress  Appearance: Normal appearance  She is not ill-appearing or toxic-appearing  HENT:      Head: Normocephalic and atraumatic  Right Ear: External ear normal       Left Ear: External ear normal       Nose: Nose normal       Mouth/Throat:      Mouth: Mucous membranes are moist       Pharynx: Oropharynx is clear  Eyes:      General: No scleral icterus  Extraocular Movements: Extraocular movements intact  Cardiovascular:      Rate and Rhythm: Regular rhythm  Tachycardia present  Pulses: Normal pulses  Pulmonary:      Effort: Pulmonary effort is normal  No respiratory distress  Breath sounds: Wheezing present  Abdominal:      Palpations: Abdomen is soft  Tenderness: There is no abdominal tenderness  Musculoskeletal:         General: Normal range of motion  Cervical back: Normal range of motion and neck supple  Skin:     General: Skin is warm  Capillary Refill: Capillary refill takes less than 2 seconds  Neurological:      General: No focal deficit present  Mental Status: She is alert and oriented to person, place, and time           ED Medications  Medications   albuterol inhalation solution 10 mg (10 mg Nebulization Given 5/18/23 2300)   ipratropium (ATROVENT) 0 02 % inhalation solution 1 mg (1 mg Nebulization Given 5/18/23 2300)   sodium chloride 0 9 % inhalation solution 12 mL (12 mL Nebulization Given 5/18/23 2300)   predniSONE tablet 40 mg (40 mg Oral Given 5/18/23 2300)   acetaminophen (TYLENOL) tablet 650 mg (650 mg Oral Given 5/19/23 0125)   azithromycin (ZITHROMAX) tablet 500 mg (500 mg Oral Given 5/19/23 0125)       Diagnostic Studies  Results Reviewed     Procedure Component Value Units Date/Time    HS Troponin 0hr (reflex protocol) [[id number]]  (Normal) Collected: 05/18/23 2308    Lab Status: Final result Specimen: Blood from Arm, Right Updated: 05/19/23 0013     hs TnI 0hr 3 ng/L     Basic metabolic panel [[id number]]  (Abnormal) Collected: 05/18/23 2308    Lab Status: Final result Specimen: Blood from Arm, Right Updated: 05/18/23 2359     Sodium 136 mmol/L      Potassium 4 0 mmol/L      Chloride 109 mmol/L      CO2 27 mmol/L      ANION GAP 0 mmol/L      BUN 14 mg/dL      Creatinine 0 68 mg/dL      Glucose 108 mg/dL      Calcium 8 9 mg/dL      eGFR 98 ml/min/1 73sq m     Narrative:      [place] guidelines for Chronic Kidney Disease (CKD):   •  Stage 1 with normal or high GFR (GFR > 90 mL/min/1 73 square meters)  •  Stage 2 Mild CKD (GFR = 60-89 mL/min/1 73 square meters)  •  Stage 3A Moderate CKD (GFR = 45-59 mL/min/1 73 square meters)  •  Stage 3B Moderate CKD (GFR = 30-44 mL/min/1 73 square meters)  •  Stage 4 Severe CKD (GFR = 15-29 mL/min/1 73 square meters)  •  Stage 5 End Stage CKD (GFR <15 mL/min/1 73 square meters)  Note: GFR calculation is accurate only with a steady state creatinine    CBC and differential [[id number]]  (Abnormal) Collected: 05/18/23 2308    Lab Status: Final result Specimen: Blood from Arm, Right Updated: 05/18/23 2348     WBC 8 23 Thousand/uL      RBC 5 20 Million/uL      Hemoglobin 16 6 g/dL      Hematocrit 49 8 %      MCV 96 fL      MCH 31 9 pg      MCHC 33 3 g/dL      RDW 12 3 %      MPV 10 4 fL      Platelets 675 Thousands/uL      nRBC 0 /100 WBCs      Neutrophils Relative 71 %      Immat GRANS % 0 %      Lymphocytes Relative 18 %      Monocytes Relative 10 %      Eosinophils Relative 1 %      Basophils Relative 0 %      Neutrophils Absolute 5 82 Thousands/µL Immature Grans Absolute 0 03 Thousand/uL      Lymphocytes Absolute 1 46 Thousands/µL      Monocytes Absolute 0 84 Thousand/µL      Eosinophils Absolute 0 07 Thousand/µL      Basophils Absolute 0 01 Thousands/µL                  XR chest 2 views   ED Interpretation by Jama Jaimes DO (05/19 0059)   Hyperinflated  No infiltrate, consolidation  Procedures  Procedures      ED Course  ED Course as of 05/19/23 0142   Fri May 19, 2023   0120 Reassessed  Patient feels much better             HEART Risk Score    Flowsheet Row Most Recent Value   Heart Score Risk Calculator    History 0 Filed at: 05/19/2023 0142   ECG 0 Filed at: 05/19/2023 0142   Age 1 Filed at: 05/19/2023 0142   Risk Factors 1 Filed at: 05/19/2023 0142   Troponin 0 Filed at: 05/19/2023 0142   HEART Score 2 Filed at: 05/19/2023 0142                                Medical Decision Making  56-year-old female presenting for evaluation of wheezing, shortness of breath with associated chest pain, cough  Suspect that this is an asthma exacerbation or COPD exacerbation  Although patient does not have a formal diagnosis of COPD, she admits to a 20-year smoking history  Differential diagnosis also includes but is not limited to ACS, pneumothorax, pneumonia, upper respiratory infection  Plan: CBC, CMP, troponin, EKG, heart neb, steroids, chest x-ray  I personally reviewed and interpreted all of the lab work, imaging, EKGs ordered  See ED course for further discussion  Significant for: Lab work unremarkable, EKG shows normal sinus rhythm without signs of ischemia or arrhythmia, chest x-ray negative for infiltrate, consolidation  Troponin negative  Patient was reassessed and reports symptomatic improvement  To be given azithromycin for COPD exacerbation  Patient to be discharged with steroids, azithromycin, refill for inhalers  Patient was provided with pulmonology for outpatient follow-up and given strict return to ED precautions  All questions were answered at this time  I reviewed all testing with the patient: see above  I gave oral return precautions for what to return for in addition to the written return precautions  The patient (and any family present: n/a) verbalized understanding of the discharge instructions and warnings that would necessitate return to the Emergency Department  I specifically highlighted areas of special concern regarding the written and verbal discharge instructions and return precautions  All questions were answered prior to discharge  Amount and/or Complexity of Data Reviewed  Labs: ordered  Radiology: ordered and independent interpretation performed  Risk  OTC drugs  Prescription drug management  Disposition  Final diagnoses:   SOB (shortness of breath)   Cough   Reactive airway disease   Mild intermittent asthma without complication     Time reflects when diagnosis was documented in both MDM as applicable and the Disposition within this note     Time User Action Codes Description Comment    5/19/2023  1:20 AM Sharifa American Add [J44 1] COPD exacerbation ([facility] [address] )     5/19/2023  1:20 AM Sharifa American Remove [J44 1] COPD exacerbation ([facility] [address] )     5/19/2023  1:21 AM Sharifa American Add [R06 02] SOB (shortness of breath)     5/19/2023  1:21 AM Sharifa American Add [R05 9] Cough     5/19/2023  1:21 AM Sharifa American Add [V44 858] Reactive airway disease     5/19/2023  1:21 AM Sharifa American Add [J45 20] Mild intermittent asthma without complication       ED Disposition     ED Disposition   Discharge    Condition   Stable    Date/Time   Fri May 19, 2023  1:20 AM    Comment   Zuleyma Morfin discharge to home/self care                 Follow-up Information     Follow up With Specialties Details Why Contact Info Additional Ef[facility]  For Emergency Department Follow-up [id number] [id number] [phone] [facility], [address] [facility], [place], [place], [address]    [address] [facility] 34 [facility] Emergency Department Emergency Medicine  If symptoms worsen [address] [id number]  8 [facility] 64 [facility], [address], [place], [place], [address]          Discharge Medication List as of 5/19/2023  1:24 AM      START taking these medications    Details   azithromycin (ZITHROMAX) 250 mg tablet Take 2 tablets today then 1 tablet daily x 4 days, Normal      predniSONE 20 mg tablet Take 2 tablets (40 mg total) by mouth daily for 7 days, Starting Fri 5/19/2023, Until Fri 5/26/2023, Normal         CONTINUE these medications which have CHANGED    Details   budesonide-formoterol (SYMBICORT) 80-4 5 MCG/ACT inhaler Inhale 2 puffs 2 (two) times a day Rinse mouth after use , Starting Fri 5/19/2023, Normal         CONTINUE these medications which have NOT CHANGED    Details   albuterol (PROVENTIL HFA,VENTOLIN HFA) 90 mcg/act inhaler Inhale 2 puffs every 6 (six) hours as needed for wheezing, Starting Tue 9/6/2022, Normal           No discharge procedures on file  PDMP Review     None           ED Provider  Attending physically available and evaluated Wilda Freedman I managed the patient along with the ED Attending      Electronically Signed by         Bonnie Zee DO  05/19/23 6801

## 2023-05-19 NOTE — DISCHARGE INSTRUCTIONS
Prescriptions were sent to your pharmacy, use these as prescribed  Follow up with pulmonology, I am concerned that you may have COPD  Return to the ED if symptoms worsen

## 2023-05-20 NOTE — ED ATTENDING ATTESTATION
5/18/2023  IKarena MD, saw and evaluated the patient  I have discussed the patient with the resident/non-physician practitioner and agree with the resident's/non-physician practitioner's findings, Plan of Care, and MDM as documented in the resident's/non-physician practitioner's note, except where noted  All available labs and Radiology studies were reviewed  I was present for key portions of any procedure(s) performed by the resident/non-physician practitioner and I was immediately available to provide assistance  At this point I agree with the current assessment done in the Emergency Department  I have conducted an independent evaluation of this patient a history and physical is as follows: This is a 56 y o  old female who presents to the ED for evaluation of dyspnea since this morning  Known history of asthma  Productive cough, mild midsternal chest discomfort  2 known sick contacts  Using her medications without relief  Patient appears well nourished, normally developed, no acute distress  Vital signs as documented  Head exam is atraumatic  Pupils EOMI, no scleral icterus or corneal injection noted  Neck is supple without JVD  Respirations are normal without increase work of breathing or audible noises, diffuse wheezing throughout is noted  Cardiac exam shows regular rate and rhythm  Patient is moving all extremities equally, able to ambulate with normal gait under own power  Patient is awake, alert, oriented ×4 without focal neurologic deficit  Skin is warm, dry, intact without rash  A/P: This is a 56 y o  female who presents to the ED for evaluation of dyspnea  Suspect asthma exacerbation  Due to smoking, possible history of COPD  Will get labs EKG chest x-ray  Treat with heart neb, steroids, azithromycin  Rule out pneumonia, pneumothorax      ED Course       Critical Care Time  CriticalCare Time    Date/Time: 5/19/2023 12:00 AM  Performed by: Karena Suggs MD  Authorized by: Chico Arciniega MD     Critical care provider statement:     Critical care time (minutes):  33    Critical care time was exclusive of:  Separately billable procedures and treating other patients and teaching time    Critical care was necessary to treat or prevent imminent or life-threatening deterioration of the following conditions:  Respiratory failure    Critical care was time spent personally by me on the following activities:  Obtaining history from patient or surrogate, development of treatment plan with patient or surrogate, evaluation of patient's response to treatment, examination of patient, review of old charts, re-evaluation of patient's condition, ordering and review of laboratory studies, ordering and performing treatments and interventions and ordering and review of radiographic studies

## 2023-05-26 ENCOUNTER — OFFICE VISIT (OUTPATIENT)
Dept: FAMILY MEDICINE CLINIC | Facility: CLINIC | Age: 58
End: 2023-05-26

## 2023-05-26 VITALS
TEMPERATURE: 98 F | OXYGEN SATURATION: 99 % | SYSTOLIC BLOOD PRESSURE: 132 MMHG | RESPIRATION RATE: 17 BRPM | WEIGHT: 114 LBS | BODY MASS INDEX: 21.52 KG/M2 | DIASTOLIC BLOOD PRESSURE: 86 MMHG | HEART RATE: 92 BPM | HEIGHT: 61 IN

## 2023-05-26 DIAGNOSIS — G44.209 ACUTE NON INTRACTABLE TENSION-TYPE HEADACHE: ICD-10-CM

## 2023-05-26 DIAGNOSIS — B37.89 YEAST PHARYNGITIS: ICD-10-CM

## 2023-05-26 DIAGNOSIS — F17.200 SMOKER: ICD-10-CM

## 2023-05-26 DIAGNOSIS — R05.1 ACUTE COUGH: ICD-10-CM

## 2023-05-26 DIAGNOSIS — R19.7 DIARRHEA, UNSPECIFIED TYPE: ICD-10-CM

## 2023-05-26 DIAGNOSIS — J45.41 MODERATE PERSISTENT ASTHMA WITH ACUTE EXACERBATION: Primary | ICD-10-CM

## 2023-05-26 DIAGNOSIS — J02.8 YEAST PHARYNGITIS: ICD-10-CM

## 2023-05-26 DIAGNOSIS — R06.02 SHORTNESS OF BREATH: ICD-10-CM

## 2023-05-26 RX ORDER — AMOXICILLIN AND CLAVULANATE POTASSIUM 875; 125 MG/1; MG/1
1 TABLET, FILM COATED ORAL EVERY 12 HOURS SCHEDULED
Qty: 10 TABLET | Refills: 0 | Status: SHIPPED | OUTPATIENT
Start: 2023-05-26 | End: 2023-05-31

## 2023-05-26 RX ORDER — MONTELUKAST SODIUM 10 MG/1
10 TABLET ORAL
Qty: 30 TABLET | Refills: 0 | Status: SHIPPED | OUTPATIENT
Start: 2023-05-26

## 2023-05-26 RX ORDER — IPRATROPIUM BROMIDE AND ALBUTEROL SULFATE 2.5; .5 MG/3ML; MG/3ML
3 SOLUTION RESPIRATORY (INHALATION) 4 TIMES DAILY
Qty: 60 ML | Refills: 1 | Status: SHIPPED | OUTPATIENT
Start: 2023-05-26

## 2023-05-26 RX ORDER — PREDNISONE 10 MG/1
TABLET ORAL
Qty: 12 TABLET | Refills: 0 | Status: SHIPPED | OUTPATIENT
Start: 2023-05-26 | End: 2023-05-31

## 2023-05-26 NOTE — PROGRESS NOTES
Name: Tin Arita      : 1965      MRN: [id number]  Encounter Provider: Gilbert Dong PA-C  Encounter Date: 2023   Encounter department: [facility]     1  Moderate persistent asthma with acute exacerbation  Assessment & Plan:  Previously on Symbicort 2 puffs once daily, increase to 2 puffs twice daily  Completing prednisone burst 40mg daily today x total of 7 days, will extend course for another 5 days, tapering down from 30 to 20mg  Completed zpack with no improvement  Add augmentin BID x 5 days and continue albuterol as needed  Start montelukast instead of benadryl at night  Start duoneb as needed alternate with albuterol inhaler when at home  Can continue use albuterol at work, avoid excessive use of albuterol  Rinse mouth with symbicort  Suspect COPD/asthma exacerbation, pending PFTs  Reviewed ED visit on  with normal CXR  Orders:  -     ipratropium-albuterol (DUO-NEB) 0 5-2 5 mg/3 mL nebulizer solution; Take 3 mL by nebulization 4 (four) times a day  -     montelukast (SINGULAIR) 10 mg tablet; Take 1 tablet (10 mg total) by mouth daily at bedtime  -     predniSONE 10 mg tablet; Take 3 tablets (30 mg total) by mouth daily for 2 days, THEN 2 tablets (20 mg total) daily for 3 days  In the morning with food  -     amoxicillin-clavulanate (AUGMENTIN) 875-125 mg per tablet; Take 1 tablet by mouth every 12 (twelve) hours for 5 days  -     Complete PFT with post bronchodilator; Future    2  Acute cough  Comments:  x9 days, negative CXR done in ED on day of onset on , continue OTC cough suppressants as needed    3  Yeast pharyngitis  Comments:  suspect based on appearance of tongue and sore throat, start nystatin swish and swallow  Orders:  -     nystatin (MYCOSTATIN) 500,000 units/5 mL suspension; Apply 5 mL (500,000 Units total) to the mouth or throat 4 (four) times a day    4   Acute non intractable tension-type headache  Comments:  avoid excessive albuterol use, increase fluid hydration and continue tylenol prn     5  Diarrhea, unspecified type  Comments:  increase fluids, recommend BRAT diet, caution with use of anitbiotics    6  Shortness of breath  Comments:  continue smoking cessation, 99% O2 on room air, extend prednisone x 5 more days taper down    7  Smoker 30 pk-year  Assessment & Plan:  Quit smoking with recent asthma exacerbation  Continue smoking cessation, consider nicotine replacement therapy  Ordered PFTs, follow-up as scheduled with pulm on 6/14/23  Encouraged continued smoking cessation  Umang Langford is a 56 y o  female with a h/o smoking, asthma who presents with acute cough, chest congestion, shortness of breath, headaches x 9 days  Started on the day she went to the ER, felt SOB walking from parking lot and was seen immediately was sent home even though she wanted to stay because she was not feeling better with nebulizer and thinks she was getting too much albuterol  She completed the zpack and is finishing last dose of prednisone today  Finished an entire Mucinex bottle and now taking Tylenol cough and cold  Was caring for kids who were sick with URI and works as nurse on the psych unit  She called for appointment with pulm and had a cancellation appointment June 14  No fever or chills, negative home COVID test but not tasting well and some pain with swallowing  Does not feel well overall 70% compared to when initially went to ER, not much better even after medications  Ran out of nebulizer solution  Has only been using Symbicort once daily, not rinsing mouth after but drinking water  Had 1-2 puffs of a cigarette this week but otherwise quit smoking and planning to stop entirely, has lozenges at home  Takes benadryl at night to sleep at times, works weekend shift  Review of Systems   Constitutional: Positive for appetite change  Negative for chills and fever     HENT: Positive for "congestion, postnasal drip, sore throat, trouble swallowing and voice change (hoarse)  Negative for rhinorrhea, sinus pressure and sinus pain  Eyes: Negative for pain, discharge, redness and itching  Respiratory: Positive for cough, chest tightness, shortness of breath and wheezing  Cardiovascular: Negative for chest pain and palpitations  Gastrointestinal: Positive for diarrhea  Negative for abdominal pain, constipation, nausea and vomiting  Neurological: Positive for headaches  Negative for seizures, syncope, weakness and light-headedness  Current Outpatient Medications on File Prior to Visit   Medication Sig   • albuterol (PROVENTIL HFA,VENTOLIN HFA) 90 mcg/act inhaler Inhale 2 puffs every 6 (six) hours as needed for wheezing   • budesonide-formoterol (SYMBICORT) 80-4 5 MCG/ACT inhaler Inhale 2 puffs 2 (two) times a day Rinse mouth after use  • triamcinolone (KENALOG) 0 1 % ointment    • [DISCONTINUED] predniSONE 20 mg tablet Take 2 tablets (40 mg total) by mouth daily for 7 days       Objective     /86 (BP Location: Left arm, Patient Position: Sitting, Cuff Size: Large)   Pulse 92   Temp 98 °F (36 7 °C) (Tympanic)   Resp 17   Ht 5' 1\" (1 549 m)   Wt 51 7 kg (114 lb)   LMP  (LMP Unknown)   SpO2 99%   BMI 21 54 kg/m²     Physical Exam  Vitals and nursing note reviewed  Constitutional:       Appearance: Normal appearance  She is normal weight  HENT:      Head: Normocephalic and atraumatic  Right Ear: Tympanic membrane normal       Left Ear: Tympanic membrane normal       Nose: Nose normal       Mouth/Throat:      Mouth: Mucous membranes are moist       Tongue: Lesions (white exudate, does not scrape off easily) present  Pharynx: Uvula midline  Posterior oropharyngeal erythema present  No pharyngeal swelling, oropharyngeal exudate or uvula swelling  Eyes:      Extraocular Movements: Extraocular movements intact        Conjunctiva/sclera: Conjunctivae normal       " Pupils: Pupils are equal, round, and reactive to light  Cardiovascular:      Rate and Rhythm: Normal rate and regular rhythm  Pulses: Normal pulses  Heart sounds: Normal heart sounds  Pulmonary:      Effort: Pulmonary effort is normal       Breath sounds: Examination of the right-upper field reveals decreased breath sounds  Examination of the left-upper field reveals decreased breath sounds  Examination of the right-middle field reveals decreased breath sounds  Examination of the left-middle field reveals decreased breath sounds  Examination of the right-lower field reveals decreased breath sounds and rhonchi  Examination of the left-lower field reveals decreased breath sounds and rhonchi  Decreased breath sounds and rhonchi present  No wheezing  Musculoskeletal:      Cervical back: Normal range of motion  Lymphadenopathy:      Cervical: No cervical adenopathy (anterior cervical chain tenderness b/l)  Neurological:      Mental Status: She is alert and oriented to person, place, and time  Mental status is at baseline         Rafael Murray PA-C

## 2023-05-26 NOTE — ASSESSMENT & PLAN NOTE
Quit smoking with recent asthma exacerbation  Continue smoking cessation, consider nicotine replacement therapy  Ordered PFTs, follow-up as scheduled with pulm on 6/14/23  Encouraged continued smoking cessation

## 2023-05-26 NOTE — ASSESSMENT & PLAN NOTE
Previously on Symbicort 2 puffs once daily, increase to 2 puffs twice daily  Completing prednisone burst 40mg daily today x total of 7 days, will extend course for another 5 days, tapering down from 30 to 20mg  Completed zpack with no improvement  Add augmentin BID x 5 days and continue albuterol as needed  Start montelukast instead of benadryl at night  Start duoneb as needed alternate with albuterol inhaler when at home  Can continue use albuterol at work, avoid excessive use of albuterol  Rinse mouth with symbicort  Suspect COPD/asthma exacerbation, pending PFTs  Reviewed ED visit on 5/18 with normal CXR

## 2023-07-12 PROCEDURE — 88305 TISSUE EXAM BY PATHOLOGIST: CPT | Performed by: PATHOLOGY

## 2023-07-12 PROCEDURE — 88342 IMHCHEM/IMCYTCHM 1ST ANTB: CPT | Performed by: PATHOLOGY

## 2023-07-14 ENCOUNTER — LAB REQUISITION (OUTPATIENT)
Dept: LAB | Facility: HOSPITAL | Age: 58
End: 2023-07-14
Payer: COMMERCIAL

## 2023-07-14 DIAGNOSIS — L43.8 OTHER LICHEN PLANUS: ICD-10-CM

## 2023-07-18 PROCEDURE — 88305 TISSUE EXAM BY PATHOLOGIST: CPT | Performed by: PATHOLOGY

## 2023-07-18 PROCEDURE — 88342 IMHCHEM/IMCYTCHM 1ST ANTB: CPT | Performed by: PATHOLOGY

## 2023-08-15 ENCOUNTER — OFFICE VISIT (OUTPATIENT)
Dept: FAMILY MEDICINE CLINIC | Facility: CLINIC | Age: 58
End: 2023-08-15
Payer: COMMERCIAL

## 2023-08-15 VITALS
OXYGEN SATURATION: 99 % | WEIGHT: 113 LBS | DIASTOLIC BLOOD PRESSURE: 72 MMHG | SYSTOLIC BLOOD PRESSURE: 120 MMHG | HEIGHT: 61 IN | TEMPERATURE: 98.2 F | BODY MASS INDEX: 21.34 KG/M2 | HEART RATE: 99 BPM | RESPIRATION RATE: 17 BRPM

## 2023-08-15 DIAGNOSIS — R30.0 DYSURIA: Primary | ICD-10-CM

## 2023-08-15 DIAGNOSIS — J45.40 MODERATE PERSISTENT ASTHMA WITHOUT COMPLICATION: ICD-10-CM

## 2023-08-15 DIAGNOSIS — R35.0 FREQUENCY OF URINATION: ICD-10-CM

## 2023-08-15 DIAGNOSIS — R39.15 URINARY URGENCY: ICD-10-CM

## 2023-08-15 DIAGNOSIS — Z13.6 SCREENING FOR CARDIOVASCULAR CONDITION: ICD-10-CM

## 2023-08-15 LAB
BACTERIA UR QL AUTO: ABNORMAL /HPF
BILIRUB UR QL STRIP: NEGATIVE
CAOX CRY URNS QL MICRO: ABNORMAL /HPF
CLARITY UR: ABNORMAL
COLOR UR: ABNORMAL
GLUCOSE UR STRIP-MCNC: NEGATIVE MG/DL
HGB UR QL STRIP.AUTO: ABNORMAL
KETONES UR STRIP-MCNC: NEGATIVE MG/DL
LEUKOCYTE ESTERASE UR QL STRIP: NEGATIVE
MUCOUS THREADS UR QL AUTO: ABNORMAL
NITRITE UR QL STRIP: NEGATIVE
NON-SQ EPI CELLS URNS QL MICRO: ABNORMAL /HPF
PH UR STRIP.AUTO: 5.5 [PH]
PROT UR STRIP-MCNC: ABNORMAL MG/DL
RBC #/AREA URNS AUTO: ABNORMAL /HPF
SP GR UR STRIP.AUTO: 1.02 (ref 1–1.03)
UROBILINOGEN UR STRIP-ACNC: <2 MG/DL
WBC #/AREA URNS AUTO: ABNORMAL /HPF

## 2023-08-15 PROCEDURE — 99214 OFFICE O/P EST MOD 30 MIN: CPT | Performed by: PHYSICIAN ASSISTANT

## 2023-08-15 PROCEDURE — 81001 URINALYSIS AUTO W/SCOPE: CPT | Performed by: PHYSICIAN ASSISTANT

## 2023-08-15 PROCEDURE — 87086 URINE CULTURE/COLONY COUNT: CPT | Performed by: PHYSICIAN ASSISTANT

## 2023-08-15 RX ORDER — CLOBETASOL PROPIONATE 0.5 MG/G
CREAM TOPICAL
COMMUNITY
Start: 2023-07-12

## 2023-08-15 RX ORDER — CIPROFLOXACIN 250 MG/1
250 TABLET, FILM COATED ORAL EVERY 12 HOURS SCHEDULED
Qty: 10 TABLET | Refills: 0 | Status: SHIPPED | OUTPATIENT
Start: 2023-08-15 | End: 2023-08-20

## 2023-08-15 NOTE — PROGRESS NOTES
Name: Rita Mc      : 1965      MRN: [id number]  Encounter Provider: Oleg Farah PA-C  Encounter Date: 8/15/2023   Encounter department: [address] [place] [address]     1. Dysuria  Comments:  x 5 days, s/p episode of diarrhea, suspect UTI, send for UA, canceled dip due to taking azo, stop azo, start cipro x 5 days, caution with abx, repeat UA after   Orders:  -     UA w Reflex to Microscopic w Reflex to Culture - Clinic Collect  -     ciprofloxacin (CIPRO) 250 mg tablet; Take 1 tablet (250 mg total) by mouth every 12 (twelve) hours for 5 days  -     UA w Reflex to Microscopic w Reflex to Culture -Lab Collect; Future; Expected date: 08/15/2023    2. Frequency of urination  Comments:  suspect due to acute UTI, no fevers or chills     3. Urinary urgency  Comments:  suspect due to acute UTI    4. Moderate persistent asthma without complication  Assessment & Plan:  Significant improvement with montelukast. Continue same with symbicort and albuterol as needed only. 5. Screening for cardiovascular condition  -     Lipid panel; Future  -     HEMOGLOBIN A1C W/ EAG ESTIMATION; Future  -     Comprehensive metabolic panel; Future           Subjective      Dax Perez is a 56 y.o. female with a h/o asthma who presents with UTI symptoms x 5 days. Started Friday at work after her  gave her diarrhea. She took 3 pills of macrobid from coworker and waited until Monday to call here but could not get in until today. No improvement. Took her daughter's 1 pill of bactrim and caused immediate vomiting. Took azo and methanamine 613CF and sodium salicyate multiple brands which provided some relief for an hour. Not sexually active in a while. Celebrating her  with wedding anniversary for 5 days at [address] [place] [address]. Diarrhea resolved. No flank pain or fever. Never had this before. Significant pain and burning with peeing. Dark red urine due to azo.      Review of Systems Constitutional: Negative for chills, fever and unexpected weight change. Respiratory: Negative for shortness of breath. Cardiovascular: Negative for chest pain. Gastrointestinal: Positive for nausea. Genitourinary: Positive for difficulty urinating, dysuria, frequency and urgency. Negative for flank pain, hematuria, vaginal bleeding, vaginal discharge and vaginal pain. Current Outpatient Medications on File Prior to Visit   Medication Sig   • albuterol (PROVENTIL HFA,VENTOLIN HFA) 90 mcg/act inhaler Inhale 2 puffs every 6 (six) hours as needed for wheezing   • budesonide-formoterol (SYMBICORT) 80-4.5 MCG/ACT inhaler Inhale 2 puffs 2 (two) times a day Rinse mouth after use. • clobetasol (TEMOVATE) 0.05 % cream    • ipratropium-albuterol (DUO-NEB) 0.5-2.5 mg/3 mL nebulizer solution Take 3 mL by nebulization 4 (four) times a day   • montelukast (SINGULAIR) 10 mg tablet Take 1 tablet (10 mg total) by mouth daily at bedtime   • [DISCONTINUED] nystatin (MYCOSTATIN) 500,000 units/5 mL suspension Apply 5 mL (500,000 Units total) to the mouth or throat 4 (four) times a day   • [DISCONTINUED] triamcinolone (KENALOG) 0.1 % ointment  (Patient not taking: Reported on 8/15/2023)       Objective     /72 (BP Location: Left arm, Patient Position: Sitting, Cuff Size: Standard)   Pulse 99   Temp 98.2 °F (36.8 °C)   Resp 17   Ht 5' 1" (1.549 m)   Wt 51.3 kg (113 lb)   LMP  (LMP Unknown)   SpO2 99%   BMI 21.35 kg/m²     Physical Exam  Vitals and nursing note reviewed. Constitutional:       Appearance: Normal appearance. HENT:      Head: Normocephalic and atraumatic. Cardiovascular:      Rate and Rhythm: Normal rate and regular rhythm. Pulses: Normal pulses. Heart sounds: Normal heart sounds. Pulmonary:      Effort: Pulmonary effort is normal.      Breath sounds: Normal breath sounds. Abdominal:      Tenderness: There is no right CVA tenderness or left CVA tenderness.    Neurological: Mental Status: She is alert and oriented to person, place, and time. Mental status is at baseline.        Adriana Nair PA-C

## 2023-08-15 NOTE — ASSESSMENT & PLAN NOTE
Significant improvement with montelukast. Continue same with symbicort and albuterol as needed only.

## 2023-08-16 LAB — BACTERIA UR CULT: NORMAL

## 2023-08-21 ENCOUNTER — APPOINTMENT (OUTPATIENT)
Dept: LAB | Facility: CLINIC | Age: 58
End: 2023-08-21
Payer: COMMERCIAL

## 2023-08-21 ENCOUNTER — HOSPITAL ENCOUNTER (OUTPATIENT)
Dept: RADIOLOGY | Facility: HOSPITAL | Age: 58
Discharge: HOME | End: 2023-08-21
Payer: COMMERCIAL

## 2023-08-21 DIAGNOSIS — R31.0 GROSS HEMATURIA: ICD-10-CM

## 2023-08-21 DIAGNOSIS — R35.0 FREQUENCY OF URINATION: ICD-10-CM

## 2023-08-21 DIAGNOSIS — Z13.6 SCREENING FOR CARDIOVASCULAR CONDITION: ICD-10-CM

## 2023-08-21 DIAGNOSIS — R39.15 URINARY URGENCY: ICD-10-CM

## 2023-08-21 DIAGNOSIS — R30.0 DYSURIA: ICD-10-CM

## 2023-08-21 DIAGNOSIS — R31.0 GROSS HEMATURIA: Primary | ICD-10-CM

## 2023-08-21 LAB
ALBUMIN SERPL BCP-MCNC: 3.5 G/DL (ref 3.5–5)
ALP SERPL-CCNC: 118 U/L (ref 46–116)
ALT SERPL W P-5'-P-CCNC: 61 U/L (ref 12–78)
ANION GAP SERPL CALCULATED.3IONS-SCNC: 5 MMOL/L
AST SERPL W P-5'-P-CCNC: 32 U/L (ref 5–45)
BACTERIA UR QL AUTO: ABNORMAL /HPF
BILIRUB SERPL-MCNC: 0.48 MG/DL (ref 0.2–1)
BILIRUB UR QL STRIP: NEGATIVE
BUN SERPL-MCNC: 19 MG/DL (ref 5–25)
CALCIUM SERPL-MCNC: 9.2 MG/DL (ref 8.3–10.1)
CAOX CRY URNS QL MICRO: ABNORMAL /HPF
CHLORIDE SERPL-SCNC: 108 MMOL/L (ref 96–108)
CHOLEST SERPL-MCNC: 156 MG/DL
CLARITY UR: CLEAR
CO2 SERPL-SCNC: 27 MMOL/L (ref 21–32)
COLOR UR: ABNORMAL
CREAT SERPL-MCNC: 0.77 MG/DL (ref 0.6–1.3)
EST. AVERAGE GLUCOSE BLD GHB EST-MCNC: 108 MG/DL
GFR SERPL CREATININE-BSD FRML MDRD: 86 ML/MIN/1.73SQ M
GLUCOSE P FAST SERPL-MCNC: 74 MG/DL (ref 65–99)
GLUCOSE UR STRIP-MCNC: NEGATIVE MG/DL
HBA1C MFR BLD: 5.4 %
HDLC SERPL-MCNC: 44 MG/DL
HGB UR QL STRIP.AUTO: ABNORMAL
KETONES UR STRIP-MCNC: NEGATIVE MG/DL
LDLC SERPL CALC-MCNC: 87 MG/DL (ref 0–100)
LEUKOCYTE ESTERASE UR QL STRIP: NEGATIVE
MUCOUS THREADS UR QL AUTO: ABNORMAL
NITRITE UR QL STRIP: NEGATIVE
NON-SQ EPI CELLS URNS QL MICRO: ABNORMAL /HPF
NONHDLC SERPL-MCNC: 112 MG/DL
PH UR STRIP.AUTO: 6 [PH]
POTASSIUM SERPL-SCNC: 4 MMOL/L (ref 3.5–5.3)
PROT SERPL-MCNC: 7.4 G/DL (ref 6.4–8.4)
PROT UR STRIP-MCNC: ABNORMAL MG/DL
RBC #/AREA URNS AUTO: ABNORMAL /HPF
SODIUM SERPL-SCNC: 140 MMOL/L (ref 135–147)
SP GR UR STRIP.AUTO: 1.02 (ref 1–1.03)
TRIGL SERPL-MCNC: 127 MG/DL
UROBILINOGEN UR STRIP-ACNC: <2 MG/DL
WBC #/AREA URNS AUTO: ABNORMAL /HPF

## 2023-08-21 PROCEDURE — 83036 HEMOGLOBIN GLYCOSYLATED A1C: CPT

## 2023-08-21 PROCEDURE — 81001 URINALYSIS AUTO W/SCOPE: CPT

## 2023-08-21 PROCEDURE — 80053 COMPREHEN METABOLIC PANEL: CPT

## 2023-08-21 PROCEDURE — 80061 LIPID PANEL: CPT

## 2023-08-21 PROCEDURE — 74018 RADEX ABDOMEN 1 VIEW: CPT

## 2023-08-21 PROCEDURE — 36415 COLL VENOUS BLD VENIPUNCTURE: CPT

## 2023-08-22 ENCOUNTER — HOSPITAL ENCOUNTER (OUTPATIENT)
Dept: ULTRASOUND IMAGING | Facility: HOSPITAL | Age: 58
Discharge: HOME | End: 2023-08-22
Payer: COMMERCIAL

## 2023-08-22 DIAGNOSIS — R31.0 GROSS HEMATURIA: ICD-10-CM

## 2023-08-22 DIAGNOSIS — R39.15 URINARY URGENCY: ICD-10-CM

## 2023-08-22 DIAGNOSIS — R35.0 FREQUENCY OF URINATION: ICD-10-CM

## 2023-08-22 PROCEDURE — 76775 US EXAM ABDO BACK WALL LIM: CPT

## 2023-08-23 ENCOUNTER — TELEPHONE (OUTPATIENT)
Dept: UROLOGY | Facility: AMBULATORY SURGERY CENTER | Age: 58
End: 2023-08-23

## 2023-08-23 NOTE — TELEPHONE ENCOUNTER
Spoke to pt and advised that the scans ordered by her PCP are not back yet. She stated she is aware of that and will wait until the results are finalized.

## 2023-08-23 NOTE — TELEPHONE ENCOUNTER
I recommend she avoid bladder irritants, hydrate with water, at least 2 L a day. She has not had a recent urine culture. I recommend urine culture am not sure if this can be added onto her recent urine testing or if she needs to repeat this. Her imaging is pending. They were ordered by her PCP so results will likely not come to us.

## 2023-08-23 NOTE — TELEPHONE ENCOUNTER
Pt under care of: Imelda Ageuro     Last Seen: 2/10/22    Pt calling due to: possible kidney stones     Patient had US done on 8/22/23 and KUB done on 8/21/23. Patient questioning if we can have these scans reviewed for her.      Symptoms:    Bladder pressure, urethral pain, frequency, urgency, intermittent painful urination      Patient was already treated for UTI     Pt can be reached at: [phone]

## 2023-08-28 DIAGNOSIS — J45.20 MILD INTERMITTENT ASTHMA WITHOUT COMPLICATION: ICD-10-CM

## 2023-08-28 DIAGNOSIS — J45.41 MODERATE PERSISTENT ASTHMA WITH ACUTE EXACERBATION: ICD-10-CM

## 2023-08-28 DIAGNOSIS — R31.0 GROSS HEMATURIA: Primary | ICD-10-CM

## 2023-08-28 RX ORDER — MONTELUKAST SODIUM 10 MG/1
10 TABLET ORAL
Qty: 90 TABLET | Refills: 1 | Status: SHIPPED | OUTPATIENT
Start: 2023-08-28

## 2023-08-28 RX ORDER — BUDESONIDE AND FORMOTEROL FUMARATE DIHYDRATE 80; 4.5 UG/1; UG/1
2 AEROSOL RESPIRATORY (INHALATION) 2 TIMES DAILY
Qty: 10.2 G | Refills: 0 | Status: SHIPPED | OUTPATIENT
Start: 2023-08-28

## 2023-08-28 NOTE — TELEPHONE ENCOUNTER
Patient with finding of punctate stones in the kidneys otherwise unremarkable findings.   Both ureteral jets visualized

## 2023-08-29 NOTE — TELEPHONE ENCOUNTER
Call placed to patient and spoke with her. Informed her of the CRNP recommendations and results. Pt is aware and thankful for the call.

## 2024-04-26 ENCOUNTER — APPOINTMENT (OUTPATIENT)
Dept: LAB | Facility: HOSPITAL | Age: 59
End: 2024-04-26
Payer: COMMERCIAL

## 2024-04-26 DIAGNOSIS — Z79.899 ENCOUNTER FOR LONG-TERM (CURRENT) USE OF OTHER MEDICATIONS: ICD-10-CM

## 2024-04-26 LAB
ALBUMIN SERPL BCP-MCNC: 4 G/DL (ref 3.5–5)
ALP SERPL-CCNC: 95 U/L (ref 34–104)
ALT SERPL W P-5'-P-CCNC: 13 U/L (ref 7–52)
ANION GAP SERPL CALCULATED.3IONS-SCNC: 9 MMOL/L (ref 4–13)
AST SERPL W P-5'-P-CCNC: 17 U/L (ref 13–39)
BASOPHILS # BLD AUTO: 0.04 THOUSANDS/ÂΜL (ref 0–0.1)
BASOPHILS NFR BLD AUTO: 1 % (ref 0–1)
BILIRUB SERPL-MCNC: 0.61 MG/DL (ref 0.2–1)
BUN SERPL-MCNC: 13 MG/DL (ref 5–25)
CALCIUM SERPL-MCNC: 9.4 MG/DL (ref 8.4–10.2)
CHLORIDE SERPL-SCNC: 105 MMOL/L (ref 96–108)
CHOLEST SERPL-MCNC: 183 MG/DL
CO2 SERPL-SCNC: 27 MMOL/L (ref 21–32)
CREAT SERPL-MCNC: 0.69 MG/DL (ref 0.6–1.3)
EOSINOPHIL # BLD AUTO: 0.22 THOUSAND/ÂΜL (ref 0–0.61)
EOSINOPHIL NFR BLD AUTO: 3 % (ref 0–6)
ERYTHROCYTE [DISTWIDTH] IN BLOOD BY AUTOMATED COUNT: 12.3 % (ref 11.6–15.1)
GFR SERPL CREATININE-BSD FRML MDRD: 97 ML/MIN/1.73SQ M
GLUCOSE P FAST SERPL-MCNC: 101 MG/DL (ref 65–99)
HCT VFR BLD AUTO: 46 % (ref 34.8–46.1)
HCV AB SER QL: NORMAL
HDLC SERPL-MCNC: 57 MG/DL
HGB BLD-MCNC: 15 G/DL (ref 11.5–15.4)
IMM GRANULOCYTES # BLD AUTO: 0.02 THOUSAND/UL (ref 0–0.2)
IMM GRANULOCYTES NFR BLD AUTO: 0 % (ref 0–2)
LDLC SERPL CALC-MCNC: 97 MG/DL (ref 0–100)
LYMPHOCYTES # BLD AUTO: 2.54 THOUSANDS/ÂΜL (ref 0.6–4.47)
LYMPHOCYTES NFR BLD AUTO: 30 % (ref 14–44)
MCH RBC QN AUTO: 31.3 PG (ref 26.8–34.3)
MCHC RBC AUTO-ENTMCNC: 32.6 G/DL (ref 31.4–37.4)
MCV RBC AUTO: 96 FL (ref 82–98)
MONOCYTES # BLD AUTO: 0.65 THOUSAND/ÂΜL (ref 0.17–1.22)
MONOCYTES NFR BLD AUTO: 8 % (ref 4–12)
NEUTROPHILS # BLD AUTO: 4.95 THOUSANDS/ÂΜL (ref 1.85–7.62)
NEUTS SEG NFR BLD AUTO: 58 % (ref 43–75)
NONHDLC SERPL-MCNC: 126 MG/DL
NRBC BLD AUTO-RTO: 0 /100 WBCS
PLATELET # BLD AUTO: 224 THOUSANDS/UL (ref 149–390)
PMV BLD AUTO: 10.1 FL (ref 8.9–12.7)
POTASSIUM SERPL-SCNC: 4.1 MMOL/L (ref 3.5–5.3)
PROT SERPL-MCNC: 6.8 G/DL (ref 6.4–8.4)
RBC # BLD AUTO: 4.79 MILLION/UL (ref 3.81–5.12)
SODIUM SERPL-SCNC: 141 MMOL/L (ref 135–147)
TRIGL SERPL-MCNC: 143 MG/DL
WBC # BLD AUTO: 8.42 THOUSAND/UL (ref 4.31–10.16)

## 2024-04-26 PROCEDURE — 36415 COLL VENOUS BLD VENIPUNCTURE: CPT

## 2024-04-26 PROCEDURE — 87522 HEPATITIS C REVRS TRNSCRPJ: CPT

## 2024-04-26 PROCEDURE — 86803 HEPATITIS C AB TEST: CPT

## 2024-04-26 PROCEDURE — 85025 COMPLETE CBC W/AUTO DIFF WBC: CPT

## 2024-04-26 PROCEDURE — 80061 LIPID PANEL: CPT

## 2024-04-26 PROCEDURE — 80053 COMPREHEN METABOLIC PANEL: CPT

## 2024-05-07 LAB
HCV RNA SERPL NAA+PROBE-ACNC: <15 IU/ML
TEST INFORMATION: NORMAL

## 2024-07-31 ENCOUNTER — APPOINTMENT (OUTPATIENT)
Dept: LAB | Facility: HOSPITAL | Age: 59
End: 2024-07-31

## 2024-07-31 DIAGNOSIS — Z00.8 ENCOUNTER FOR OTHER GENERAL EXAMINATION: ICD-10-CM

## 2024-07-31 LAB
CHOLEST SERPL-MCNC: 206 MG/DL
EST. AVERAGE GLUCOSE BLD GHB EST-MCNC: 111 MG/DL
HBA1C MFR BLD: 5.5 %
HDLC SERPL-MCNC: 63 MG/DL
LDLC SERPL CALC-MCNC: 100 MG/DL (ref 0–100)
NONHDLC SERPL-MCNC: 143 MG/DL
TRIGL SERPL-MCNC: 216 MG/DL

## 2024-07-31 PROCEDURE — 80061 LIPID PANEL: CPT

## 2024-07-31 PROCEDURE — 83036 HEMOGLOBIN GLYCOSYLATED A1C: CPT

## 2024-07-31 PROCEDURE — 36415 COLL VENOUS BLD VENIPUNCTURE: CPT

## 2024-08-14 ENCOUNTER — OFFICE VISIT (OUTPATIENT)
Dept: FAMILY MEDICINE CLINIC | Facility: CLINIC | Age: 59
End: 2024-08-14
Payer: COMMERCIAL

## 2024-08-14 VITALS
HEART RATE: 97 BPM | HEIGHT: 61 IN | TEMPERATURE: 97.4 F | RESPIRATION RATE: 16 BRPM | BODY MASS INDEX: 20.81 KG/M2 | OXYGEN SATURATION: 96 % | DIASTOLIC BLOOD PRESSURE: 82 MMHG | WEIGHT: 110.2 LBS | SYSTOLIC BLOOD PRESSURE: 120 MMHG

## 2024-08-14 DIAGNOSIS — Z12.31 SCREENING MAMMOGRAM FOR BREAST CANCER: ICD-10-CM

## 2024-08-14 DIAGNOSIS — R33.9 INCOMPLETE EMPTYING OF BLADDER: ICD-10-CM

## 2024-08-14 DIAGNOSIS — L43.9 LICHEN PLANUS: ICD-10-CM

## 2024-08-14 DIAGNOSIS — Z12.11 SCREENING FOR COLON CANCER: ICD-10-CM

## 2024-08-14 DIAGNOSIS — J45.20 MILD INTERMITTENT ASTHMA WITHOUT COMPLICATION: ICD-10-CM

## 2024-08-14 DIAGNOSIS — Z00.00 ANNUAL PHYSICAL EXAM: Primary | ICD-10-CM

## 2024-08-14 LAB
BILIRUB UR QL STRIP: NEGATIVE
CLARITY UR: CLEAR
COLOR UR: NORMAL
GLUCOSE UR STRIP-MCNC: NEGATIVE MG/DL
HGB UR QL STRIP.AUTO: NEGATIVE
KETONES UR STRIP-MCNC: NEGATIVE MG/DL
LEUKOCYTE ESTERASE UR QL STRIP: NEGATIVE
NITRITE UR QL STRIP: NEGATIVE
PH UR STRIP.AUTO: 5.5 [PH]
PROT UR STRIP-MCNC: NEGATIVE MG/DL
SP GR UR STRIP.AUTO: 1.02 (ref 1–1.03)
UROBILINOGEN UR STRIP-ACNC: <2 MG/DL

## 2024-08-14 PROCEDURE — 99396 PREV VISIT EST AGE 40-64: CPT | Performed by: PHYSICIAN ASSISTANT

## 2024-08-14 PROCEDURE — 99214 OFFICE O/P EST MOD 30 MIN: CPT | Performed by: PHYSICIAN ASSISTANT

## 2024-08-14 PROCEDURE — 81003 URINALYSIS AUTO W/O SCOPE: CPT | Performed by: PHYSICIAN ASSISTANT

## 2024-08-14 RX ORDER — BUDESONIDE AND FORMOTEROL FUMARATE DIHYDRATE 80; 4.5 UG/1; UG/1
2 AEROSOL RESPIRATORY (INHALATION) 2 TIMES DAILY
Qty: 10.2 G | Refills: 2 | Status: SHIPPED | OUTPATIENT
Start: 2024-08-14

## 2024-08-14 RX ORDER — PREDNISONE 10 MG/1
TABLET ORAL
COMMUNITY
Start: 2024-06-26

## 2024-08-14 RX ORDER — ALBUTEROL SULFATE 90 UG/1
2 AEROSOL, METERED RESPIRATORY (INHALATION) EVERY 6 HOURS PRN
Qty: 18 G | Refills: 5 | Status: SHIPPED | OUTPATIENT
Start: 2024-08-14

## 2024-08-14 NOTE — PROGRESS NOTES
Adult Annual Physical  Name: Kamini Morfin      : 1965      MRN: [id number]  Encounter Provider: Rocio Duggan PA-C  Encounter Date: 2024   Encounter department: [facility]    Assessment & Plan   1. Annual physical exam  2. Lichen planus  Assessment & Plan:  Chronic lesions on b/l lower legs.  Currently on prednisone per dermatology.  Topical clobetasol with minimal improvement.  Following with private dermatologist in [place]. Requesting records from previous biopsy.  3. Mild intermittent asthma without complication  Assessment & Plan:  No recent exacerbations.  Well-controlled on montelukast.  Continue same.  Refilled Symbicort for maintenance and albuterol as needed.  Orders:  -     budesonide-formoterol (Symbicort) 80-4.5 MCG/ACT inhaler; Inhale 2 puffs 2 (two) times a day Rinse mouth after use.  -     albuterol (PROVENTIL HFA,VENTOLIN HFA) 90 mcg/act inhaler; Inhale 2 puffs every 6 (six) hours as needed for wheezing  4. Incomplete emptying of bladder  Assessment & Plan:  Sensation of incomplete emptying and dribbling at times.  Continue with Kegel exercises.  Consider pelvic floor PT.  Check UA and ultrasound bladder with postvoid residual.  Worse in the past 6 months.  Orders:  -     US bladder with post void residual; Future; Expected date: 2024  -     UA w Reflex to Microscopic w Reflex to Culture; Future  -     UA w Reflex to Microscopic w Reflex to Culture  5. Screening mammogram for breast cancer  -     Mammo screening bilateral w 3d & cad; Future; Expected date: 2025  6. Screening for colon cancer  -     Cologuard    Immunizations and preventive care screenings were discussed with patient today. Appropriate education was printed on patient's after visit summary.    Counseling:  Alcohol/drug use: discussed moderation in alcohol intake, the recommendations for healthy alcohol use, and avoidance of illicit drug use.  Dental Health:  discussed importance of regular tooth brushing, flossing, and dental visits.  Injury prevention: discussed safety/seat belts, safety helmets, smoke detectors, carbon dioxide detectors, and smoking near bedding or upholstery.  Sexual health: discussed sexually transmitted diseases, partner selection, use of condoms, avoidance of unintended pregnancy, and contraceptive alternatives.  Exercise: the importance of regular exercise/physical activity was discussed. Recommend exercise 3-5 times per week for at least 30 minutes.          History of Present Illness     Adult Annual Physical:  Patient presents for annual physical. Kamini is a 58 y.o. female with a h/o asthma who presents for annual physical.  Planning to go to [place] next week for her birthday.  Has been having urinary issues in the past couple months.  Sometimes leaking after she feels like she empties.  Still will dribble afterwards.  Has been doing Kegel exercises.  Has not seen gynecologist in 2 years.  Did not complete mammogram or Cologuard.  Still has box from 2 years ago.  Mammogram was uncomfortable but willing to schedule.  Does feel more irritable due to prednisone.  Not improving lichens.  Considering switching dermatologist.  Has been seeing same dermatologist for the past 10+ years..     Diet and Physical Activity:  - Diet/Nutrition: well balanced diet.  - Exercise: walking.    Depression Screening:  - PHQ-2 Score: 0    General Health:  - Sleep: sleeps well.  - Hearing: normal hearing bilateral ears.  - Vision: no vision problems.  - Dental: regular dental visits.    /GYN Health:  - Follows with GYN: no.   - Menopause: postmenopausal.   - History of STDs: no    Advanced Care Planning:  - Has an advanced directive?: no    - Has a durable medical POA?: no    - ACP document given to patient?: no      Review of Systems   Constitutional:  Negative for chills and fever.   HENT:  Negative for ear pain and sore throat.    Eyes:  Negative for pain  "and visual disturbance.   Respiratory:  Negative for cough and shortness of breath.    Cardiovascular:  Negative for chest pain and palpitations.   Gastrointestinal:  Negative for abdominal pain and vomiting.   Genitourinary:  Positive for difficulty urinating and frequency. Negative for decreased urine volume, dysuria, hematuria and urgency.   Musculoskeletal:  Negative for arthralgias and back pain.   Skin:  Negative for color change and rash.   Neurological:  Negative for seizures and syncope.   All other systems reviewed and are negative.        Objective     /82 (BP Location: Left arm, Patient Position: Sitting, Cuff Size: Standard)   Pulse 97   Temp (!) 97.4 °F (36.3 °C) (Tympanic)   Resp 16   Ht 5' 1\" (1.549 m)   Wt 50 kg (110 lb 3.2 oz)   LMP  (LMP Unknown)   SpO2 96%   BMI 20.82 kg/m²     Physical Exam  Vitals and nursing note reviewed.   Constitutional:       General: She is not in acute distress.     Appearance: She is well-developed.   HENT:      Head: Normocephalic and atraumatic.      Right Ear: Tympanic membrane, ear canal and external ear normal.      Left Ear: Tympanic membrane, ear canal and external ear normal.      Mouth/Throat:      Mouth: Mucous membranes are moist.   Eyes:      Extraocular Movements: Extraocular movements intact.      Conjunctiva/sclera: Conjunctivae normal.      Pupils: Pupils are equal, round, and reactive to light.   Cardiovascular:      Rate and Rhythm: Normal rate and regular rhythm.      Heart sounds: No murmur heard.  Pulmonary:      Effort: Pulmonary effort is normal. No respiratory distress.      Breath sounds: Normal breath sounds.   Abdominal:      Palpations: Abdomen is soft.      Tenderness: There is no abdominal tenderness.   Musculoskeletal:         General: No swelling.      Cervical back: Neck supple.   Skin:     General: Skin is warm and dry.      Capillary Refill: Capillary refill takes less than 2 seconds.   Neurological:      Mental Status: " She is alert.   Psychiatric:         Mood and Affect: Mood normal.

## 2024-08-15 PROBLEM — L43.9 LICHEN PLANUS: Status: ACTIVE | Noted: 2024-08-15

## 2024-08-15 PROBLEM — R33.9 INCOMPLETE EMPTYING OF BLADDER: Status: ACTIVE | Noted: 2024-08-15

## 2024-08-15 PROBLEM — M79.642 LEFT HAND PAIN: Status: RESOLVED | Noted: 2022-03-17 | Resolved: 2024-08-15

## 2024-08-15 NOTE — ASSESSMENT & PLAN NOTE
No recent exacerbations.  Well-controlled on montelukast.  Continue same.  Refilled Symbicort for maintenance and albuterol as needed.

## 2024-08-15 NOTE — ASSESSMENT & PLAN NOTE
Chronic lesions on b/l lower legs.  Currently on prednisone per dermatology.  Topical clobetasol with minimal improvement.  Following with private dermatologist in [place]. Requesting records from previous biopsy.

## 2024-08-15 NOTE — ASSESSMENT & PLAN NOTE
Sensation of incomplete emptying and dribbling at times.  Continue with Kegel exercises.  Consider pelvic floor PT.  Check UA and ultrasound bladder with postvoid residual.  Worse in the past 6 months.

## 2024-09-18 LAB — COLOGUARD RESULT REPORTABLE: NEGATIVE

## 2025-02-13 DIAGNOSIS — J45.20 MILD INTERMITTENT ASTHMA WITHOUT COMPLICATION: ICD-10-CM

## 2025-02-13 RX ORDER — BUDESONIDE AND FORMOTEROL FUMARATE DIHYDRATE 80; 4.5 UG/1; UG/1
2 AEROSOL RESPIRATORY (INHALATION) 2 TIMES DAILY
Qty: 30.6 G | Refills: 1 | Status: SHIPPED | OUTPATIENT
Start: 2025-02-13

## (undated) DEVICE — FIBER STD QUANTA 200 MICRON

## (undated) DEVICE — BASKET SPECIMEN RETRIVAL 1.9FR 120CM

## (undated) DEVICE — URETEROSCOPE DIGITAL FLEXIBLE SNGL USE WISCOPE

## (undated) DEVICE — SPECIMEN CONTAINER STERILE PEEL PACK

## (undated) DEVICE — GLOVE SRG BIOGEL 7.5

## (undated) DEVICE — PACK TUR

## (undated) DEVICE — SCD SEQUENTIAL COMPRESSION COMFORT SLEEVE MEDIUM KNEE LENGTH: Brand: KENDALL SCD

## (undated) DEVICE — TUBING SUCTION 5MM X 12 FT

## (undated) DEVICE — GUIDEWIRE STRGHT TIP 0.035 IN  SOLO PLUS

## (undated) DEVICE — UROCATCH BAG

## (undated) DEVICE — ENDOSCOPIC VALVE WITH ADAPTER.: Brand: SURSEAL® II

## (undated) DEVICE — STERILE SURGICAL LUBRICANT,  TUBE: Brand: SURGILUBE

## (undated) DEVICE — URETERAL CATHETER POLLACK OPEN ENDED 5FR